# Patient Record
Sex: FEMALE | Race: WHITE | NOT HISPANIC OR LATINO | Employment: FULL TIME | RURAL
[De-identification: names, ages, dates, MRNs, and addresses within clinical notes are randomized per-mention and may not be internally consistent; named-entity substitution may affect disease eponyms.]

---

## 2017-03-16 ENCOUNTER — CONVERSION ENCOUNTER (OUTPATIENT)
Dept: FAMILY MEDICINE CLINIC | Facility: CLINIC | Age: 42
End: 2017-03-16

## 2017-03-16 LAB
25(OH)D3 SERPL-MCNC: 23 NG/ML (ref 30–100)
ALBUMIN SERPL-MCNC: 4 G/DL (ref 3.6–5.1)
ALP SERPL-CCNC: 58 U/L (ref 33–115)
AST SERPL-CCNC: 17 U/L (ref 10–30)
BILIRUB SERPL-MCNC: 1.2 MG/DL (ref 0.2–1.2)
BUN SERPL-MCNC: 11 MG/DL (ref 7–25)
BUN/CREAT SERPL: 12.2 (CALC) (ref 6–22)
CALCIUM SERPL-MCNC: 9.7 MG/DL (ref 8.6–10.2)
CHLORIDE SERPL-SCNC: 100 MMOL/L (ref 98–110)
CONV CO2: 26 MMOL/L (ref 21–33)
CONV TOTAL PROTEIN: 6.8 G/DL (ref 6.2–8.3)
CREAT UR-MCNC: 0.9 MG/DL (ref 0.59–1.07)
EOSINOPHIL # BLD AUTO: 100 CELLS/UL (ref 15–500)
EOSINOPHIL # BLD AUTO: 2 %
ERYTHROCYTE [DISTWIDTH] IN BLOOD BY AUTOMATED COUNT: 13.4 % (ref 11–15)
GLOBULIN UR ELPH-MCNC: 2.8 MG/DL (ref 2.2–3.9)
GLUCOSE UR QL: 98 MG/DL (ref 65–99)
HCT VFR BLD AUTO: 42.4 % (ref 35–45)
HGB BLD-MCNC: 14.6 G/DL (ref 11.7–15.5)
INSULIN SERPL-ACNC: 1.4 (CALC) (ref 1–2.1)
LYMPHOCYTES # BLD AUTO: 1600 CELLS/UL (ref 850–3900)
LYMPHOCYTES NFR BLD AUTO: 20 %
MCH RBC QN AUTO: 28.7 PG (ref 27–33)
MCHC RBC AUTO-ENTMCNC: 34.3 G/DL (ref 32–36)
MCV RBC AUTO: 83.7 FL (ref 80–100)
MONOCYTES # BLD AUTO: 800 CELLS/UL (ref 200–950)
MONOCYTES NFR BLD AUTO: 10 %
NEUTROPHILS # BLD AUTO: 5500 CELLS/UL (ref 1500–7800)
NEUTROPHILS NFR BLD AUTO: 69 %
PLATELET # BLD AUTO: 225 10*3/UL (ref 140–400)
PMV BLD AUTO: 9.6 FL (ref 7.5–11.5)
POTASSIUM SERPL-SCNC: 4.1 MMOL/L (ref 3.5–5.3)
RBC # BLD AUTO: 5.07 MILLION/UL (ref 3.8–5.1)
SODIUM SERPL-SCNC: 136 MMOL/L (ref 135–146)
T4 FREE SERPL-MCNC: 1.2 NG/DL (ref 0.8–1.8)
TSH SERPL-ACNC: 6.67 MIU/L
WBC # BLD AUTO: 8.1 10*3/UL (ref 3.8–10.8)

## 2018-03-27 ENCOUNTER — CONVERSION ENCOUNTER (OUTPATIENT)
Dept: FAMILY MEDICINE CLINIC | Facility: CLINIC | Age: 43
End: 2018-03-27

## 2018-03-27 LAB
ALBUMIN SERPL-MCNC: 4.2 G/DL (ref 3.6–5.1)
ALP SERPL-CCNC: 69 U/L (ref 33–115)
AST SERPL-CCNC: 25 U/L (ref 10–30)
BILIRUB SERPL-MCNC: 0.7 MG/DL (ref 0.2–1.2)
BUN SERPL-MCNC: 14 MG/DL (ref 7–25)
BUN/CREAT SERPL: 12.7 (CALC) (ref 6–22)
CALCIUM SERPL-MCNC: 9.8 MG/DL (ref 8.6–10.2)
CHLORIDE SERPL-SCNC: 102 MMOL/L (ref 98–110)
CHOLEST SERPL-MCNC: 153 MG/DL (ref 125–200)
CONV CO2: 27 MMOL/L (ref 21–33)
CONV TOTAL PROTEIN: 6.4 G/DL (ref 6.2–8.3)
CREAT UR-MCNC: 1.1 MG/DL (ref 0.59–1.07)
EOSINOPHIL # BLD AUTO: 100 CELLS/UL (ref 15–500)
EOSINOPHIL # BLD AUTO: 3 %
ERYTHROCYTE [DISTWIDTH] IN BLOOD BY AUTOMATED COUNT: 13.5 % (ref 11–15)
GLOBULIN UR ELPH-MCNC: 2.2 MG/DL (ref 2.2–3.9)
GLUCOSE UR QL: 92 MG/DL (ref 65–99)
HCT VFR BLD AUTO: 40.4 % (ref 35–45)
HDLC SERPL-MCNC: 74 MG/DL
HGB BLD-MCNC: 13.9 G/DL (ref 11.7–15.5)
INSULIN SERPL-ACNC: 1.9 (CALC) (ref 1–2.1)
LDLC SERPL CALC-MCNC: 71 MG/DL
LYMPHOCYTES # BLD AUTO: 1500 CELLS/UL (ref 850–3900)
LYMPHOCYTES NFR BLD AUTO: 33 %
MCH RBC QN AUTO: 27.8 PG (ref 27–33)
MCHC RBC AUTO-ENTMCNC: 34.5 G/DL (ref 32–36)
MCV RBC AUTO: 80.6 FL (ref 80–100)
MONOCYTES # BLD AUTO: 400 CELLS/UL (ref 200–950)
MONOCYTES NFR BLD AUTO: 9 %
NEUTROPHILS # BLD AUTO: 2400 CELLS/UL (ref 1500–7800)
NEUTROPHILS NFR BLD AUTO: 54 %
PLATELET # BLD AUTO: 281 10*3/UL (ref 140–400)
PMV BLD AUTO: 8.2 FL (ref 7.5–11.5)
POTASSIUM SERPL-SCNC: 4.2 MMOL/L (ref 3.5–5.3)
RBC # BLD AUTO: 5.01 MILLION/UL (ref 3.8–5.1)
SODIUM SERPL-SCNC: 137 MMOL/L (ref 135–146)
T4 FREE SERPL-MCNC: 1.4 NG/DL (ref 0.8–1.8)
TRIGL SERPL-MCNC: 42 MG/DL
TSH SERPL-ACNC: 0.02 MIU/L
WBC # BLD AUTO: 4.5 10*3/UL (ref 3.8–10.8)

## 2019-04-02 ENCOUNTER — CONVERSION ENCOUNTER (OUTPATIENT)
Dept: FAMILY MEDICINE CLINIC | Facility: CLINIC | Age: 44
End: 2019-04-02

## 2019-04-03 LAB
25(OH)D3 SERPL-MCNC: 24 NG/ML (ref 30–100)
ALBUMIN SERPL-MCNC: 4.4 G/DL (ref 3.6–5.1)
ALP SERPL-CCNC: 76 U/L (ref 33–115)
ALT SERPL-CCNC: 8 U/L (ref 6–29)
AST SERPL-CCNC: 11 U/L (ref 10–35)
BASOPHILS # BLD AUTO: 50 CELLS/UL (ref 0–200)
BASOPHILS NFR BLD AUTO: 0.9 %
BILIRUB SERPL-MCNC: 1.6 MG/DL (ref 0.2–1.2)
BUN SERPL-MCNC: 15 MG/DL (ref 7–25)
BUN/CREAT SERPL: ABNORMAL (CALC) (ref 6–22)
CALCIUM SERPL-MCNC: 9.7 MG/DL (ref 8.6–10.2)
CHLORIDE SERPL-SCNC: 103 MMOL/L (ref 98–110)
CHOLEST SERPL-MCNC: 184 MG/DL
CHOLEST/HDLC SERPL: 2.3 (CALC)
CONV CO2: 29 MMOL/L (ref 20–32)
CONV TOTAL PROTEIN: 6.9 G/DL (ref 6.1–8.1)
CREAT UR-MCNC: 0.99 MG/DL (ref 0.5–1.1)
EOSINOPHIL # BLD AUTO: 1.4 %
EOSINOPHIL # BLD AUTO: 78 CELLS/UL (ref 15–500)
ERYTHROCYTE [DISTWIDTH] IN BLOOD BY AUTOMATED COUNT: 12.9 % (ref 11–15)
GLOBULIN UR ELPH-MCNC: 2.5 MG/DL (ref 1.9–3.7)
GLUCOSE UR QL: 97 MG/DL (ref 65–99)
HCT VFR BLD AUTO: 41.7 % (ref 35–45)
HDLC SERPL-MCNC: 79 MG/DL
HGB BLD-MCNC: 14.2 G/DL (ref 11.7–15.5)
INSULIN SERPL-ACNC: 1.8 (CALC) (ref 1–2.5)
LDLC SERPL CALC-MCNC: 91 MG/DL
LYMPHOCYTES # BLD AUTO: 1372 CELLS/UL (ref 850–3900)
LYMPHOCYTES NFR BLD AUTO: 24.5 %
MCH RBC QN AUTO: 28.3 PG (ref 27–33)
MCHC RBC AUTO-ENTMCNC: 34.1 G/DL (ref 32–36)
MCV RBC AUTO: 83.2 FL (ref 80–100)
MONOCYTES # BLD AUTO: 476 CELLS/UL (ref 200–950)
MONOCYTES NFR BLD AUTO: 8.5 %
NEUTROPHILS # BLD AUTO: 3623 CELLS/UL (ref 1500–7800)
NEUTROPHILS NFR BLD AUTO: 64.7 %
NONHDLC SERPL-MCNC: 105 MG/DL
PLATELET # BLD AUTO: 263 10*3/UL (ref 140–400)
PMV BLD AUTO: 10.5 FL (ref 7.5–12.5)
POTASSIUM SERPL-SCNC: 4.2 MMOL/L (ref 3.5–5.3)
RBC # BLD AUTO: 5.01 MILLION/UL (ref 3.8–5.1)
SODIUM SERPL-SCNC: 138 MMOL/L (ref 135–146)
TRIGL SERPL-MCNC: 59 MG/DL
TSH SERPL-ACNC: 0.81 MIU/L
WBC # BLD AUTO: 5.6 10*3/UL (ref 3.8–10.8)

## 2019-07-19 DIAGNOSIS — K29.70 GASTRITIS, PRESENCE OF BLEEDING UNSPECIFIED, UNSPECIFIED CHRONICITY, UNSPECIFIED GASTRITIS TYPE: Primary | ICD-10-CM

## 2019-07-19 RX ORDER — DICYCLOMINE HCL 20 MG
20 TABLET ORAL EVERY 6 HOURS
Qty: 120 TABLET | Refills: 2 | Status: SHIPPED | OUTPATIENT
Start: 2019-07-19 | End: 2020-04-12

## 2019-07-19 NOTE — TELEPHONE ENCOUNTER
PT  STATES SHE IS HAVING FLARE UP OF GASTRITIS -  ABDOMINAL PAIN -    ASKS FOR SOMETHING FOR PAIN -   PLEASE CALL    [facility]   [place]

## 2019-09-21 DIAGNOSIS — E55.9 VITAMIN D DEFICIENCY: Primary | ICD-10-CM

## 2019-09-24 RX ORDER — CHOLECALCIFEROL (VITAMIN D3) 1250 MCG
CAPSULE ORAL
Qty: 4 CAPSULE | Refills: 1 | Status: SHIPPED | OUTPATIENT
Start: 2019-09-24 | End: 2020-08-06

## 2019-10-17 DIAGNOSIS — E03.9 HYPOTHYROIDISM, UNSPECIFIED TYPE: Primary | ICD-10-CM

## 2019-10-18 RX ORDER — LEVOTHYROXINE SODIUM 0.12 MG/1
TABLET ORAL
Qty: 30 TABLET | Refills: 5 | Status: SHIPPED | OUTPATIENT
Start: 2019-10-18 | End: 2020-03-16

## 2019-11-20 DIAGNOSIS — J32.9 SINUSITIS, UNSPECIFIED CHRONICITY, UNSPECIFIED LOCATION: Primary | ICD-10-CM

## 2019-11-20 RX ORDER — AZITHROMYCIN 250 MG/1
TABLET, FILM COATED ORAL
Qty: 6 TABLET | Refills: 0 | Status: SHIPPED | OUTPATIENT
Start: 2019-11-20 | End: 2020-04-12

## 2019-11-20 NOTE — TELEPHONE ENCOUNTER
Pt called and said that she is having sinus pressure and congestion and is wanting to know if you could send in a antiboitc or does she need to be seen.

## 2020-03-15 DIAGNOSIS — E03.9 HYPOTHYROIDISM, UNSPECIFIED TYPE: ICD-10-CM

## 2020-03-16 RX ORDER — LEVOTHYROXINE SODIUM 0.12 MG/1
TABLET ORAL
Qty: 30 TABLET | Refills: 5 | Status: SHIPPED | OUTPATIENT
Start: 2020-03-16 | End: 2020-09-21

## 2020-03-31 DIAGNOSIS — Z13.220 SCREENING FOR HYPERLIPIDEMIA: ICD-10-CM

## 2020-03-31 DIAGNOSIS — E03.9 HYPOTHYROIDISM, UNSPECIFIED TYPE: ICD-10-CM

## 2020-03-31 DIAGNOSIS — R53.83 MALAISE AND FATIGUE: ICD-10-CM

## 2020-03-31 DIAGNOSIS — R53.81 MALAISE AND FATIGUE: ICD-10-CM

## 2020-03-31 DIAGNOSIS — E55.9 VITAMIN D DEFICIENCY: Primary | ICD-10-CM

## 2020-04-01 LAB
25(OH)D3+25(OH)D2 SERPL-MCNC: 30.3 NG/ML (ref 30–100)
ALBUMIN SERPL-MCNC: 4.5 G/DL (ref 3.8–4.8)
ALBUMIN/GLOB SERPL: 2 {RATIO} (ref 1.2–2.2)
ALP SERPL-CCNC: 60 IU/L (ref 39–117)
ALT SERPL-CCNC: 9 IU/L (ref 0–32)
AST SERPL-CCNC: 13 IU/L (ref 0–40)
BASOPHILS # BLD AUTO: 0.1 X10E3/UL (ref 0–0.2)
BASOPHILS NFR BLD AUTO: 1 %
BILIRUB SERPL-MCNC: 1.7 MG/DL (ref 0–1.2)
BUN SERPL-MCNC: 10 MG/DL (ref 6–24)
BUN/CREAT SERPL: 9 (ref 9–23)
CALCIUM SERPL-MCNC: 9.5 MG/DL (ref 8.7–10.2)
CHLORIDE SERPL-SCNC: 102 MMOL/L (ref 96–106)
CHOLEST SERPL-MCNC: 157 MG/DL (ref 100–199)
CHOLEST/HDLC SERPL: 2.5 RATIO (ref 0–4.4)
CO2 SERPL-SCNC: 22 MMOL/L (ref 20–29)
CREAT SERPL-MCNC: 1.11 MG/DL (ref 0.57–1)
EOSINOPHIL # BLD AUTO: 0.1 X10E3/UL (ref 0–0.4)
EOSINOPHIL NFR BLD AUTO: 1 %
ERYTHROCYTE [DISTWIDTH] IN BLOOD BY AUTOMATED COUNT: 12.7 % (ref 11.7–15.4)
GLOBULIN SER CALC-MCNC: 2.3 G/DL (ref 1.5–4.5)
GLUCOSE SERPL-MCNC: 90 MG/DL (ref 65–99)
HCT VFR BLD AUTO: 40.8 % (ref 34–46.6)
HDLC SERPL-MCNC: 64 MG/DL
HGB BLD-MCNC: 13.6 G/DL (ref 11.1–15.9)
IMM GRANULOCYTES # BLD AUTO: 0 X10E3/UL (ref 0–0.1)
IMM GRANULOCYTES NFR BLD AUTO: 0 %
LDLC SERPL CALC-MCNC: 85 MG/DL (ref 0–99)
LYMPHOCYTES # BLD AUTO: 1.3 X10E3/UL (ref 0.7–3.1)
LYMPHOCYTES NFR BLD AUTO: 25 %
MCH RBC QN AUTO: 28 PG (ref 26.6–33)
MCHC RBC AUTO-ENTMCNC: 33.3 G/DL (ref 31.5–35.7)
MCV RBC AUTO: 84 FL (ref 79–97)
MONOCYTES # BLD AUTO: 0.5 X10E3/UL (ref 0.1–0.9)
MONOCYTES NFR BLD AUTO: 9 %
NEUTROPHILS # BLD AUTO: 3.5 X10E3/UL (ref 1.4–7)
NEUTROPHILS NFR BLD AUTO: 64 %
PLATELET # BLD AUTO: 233 X10E3/UL (ref 150–450)
POTASSIUM SERPL-SCNC: 4.1 MMOL/L (ref 3.5–5.2)
PROT SERPL-MCNC: 6.8 G/DL (ref 6–8.5)
RBC # BLD AUTO: 4.85 X10E6/UL (ref 3.77–5.28)
SODIUM SERPL-SCNC: 139 MMOL/L (ref 134–144)
T4 FREE SERPL-MCNC: 1.59 NG/DL (ref 0.82–1.77)
TRIGL SERPL-MCNC: 41 MG/DL (ref 0–149)
TSH SERPL DL<=0.005 MIU/L-ACNC: 0.07 UIU/ML (ref 0.45–4.5)
VLDLC SERPL CALC-MCNC: 8 MG/DL (ref 5–40)
WBC # BLD AUTO: 5.4 X10E3/UL (ref 3.4–10.8)

## 2020-04-03 ENCOUNTER — TELEPHONE (OUTPATIENT)
Dept: FAMILY MEDICINE CLINIC | Facility: CLINIC | Age: 45
End: 2020-04-03

## 2020-04-03 NOTE — TELEPHONE ENCOUNTER
----- Message from Catalino Valentin MD sent at 4/1/2020  9:25 AM EDT -----  Add a free T4 onto her labs drawn yesterday, then

## 2020-04-07 ENCOUNTER — OFFICE VISIT (OUTPATIENT)
Dept: FAMILY MEDICINE CLINIC | Facility: CLINIC | Age: 45
End: 2020-04-07

## 2020-04-07 VITALS
OXYGEN SATURATION: 100 % | HEIGHT: 62 IN | BODY MASS INDEX: 26.57 KG/M2 | WEIGHT: 144.4 LBS | HEART RATE: 78 BPM | SYSTOLIC BLOOD PRESSURE: 121 MMHG | DIASTOLIC BLOOD PRESSURE: 75 MMHG | RESPIRATION RATE: 18 BRPM | TEMPERATURE: 98.2 F

## 2020-04-07 DIAGNOSIS — N95.1 MENOPAUSAL SYNDROME: ICD-10-CM

## 2020-04-07 DIAGNOSIS — Z00.01 ENCOUNTER FOR ANNUAL GENERAL MEDICAL EXAMINATION WITH ABNORMAL FINDINGS IN ADULT: Primary | ICD-10-CM

## 2020-04-07 DIAGNOSIS — Z12.31 ENCOUNTER FOR SCREENING MAMMOGRAM FOR MALIGNANT NEOPLASM OF BREAST: ICD-10-CM

## 2020-04-07 DIAGNOSIS — E66.3 OVERWEIGHT: ICD-10-CM

## 2020-04-07 DIAGNOSIS — E03.9 ACQUIRED HYPOTHYROIDISM: ICD-10-CM

## 2020-04-07 PROBLEM — R73.9 ELEVATED BLOOD SUGAR: Status: ACTIVE | Noted: 2020-04-07

## 2020-04-07 PROBLEM — Z30.2 REQUEST FOR STERILIZATION: Status: ACTIVE | Noted: 2020-04-07

## 2020-04-07 PROBLEM — E55.9 VITAMIN D DEFICIENCY: Status: ACTIVE | Noted: 2020-04-07

## 2020-04-07 PROBLEM — I10 HYPERTENSION, BENIGN: Status: ACTIVE | Noted: 2020-04-07

## 2020-04-07 PROBLEM — J30.9 ALLERGIC RHINITIS: Status: ACTIVE | Noted: 2020-04-07

## 2020-04-07 PROBLEM — J45.909 ASTHMA: Status: ACTIVE | Noted: 2020-04-07

## 2020-04-07 PROBLEM — Z22.330 CARRIER OF GROUP B STREPTOCOCCUS: Status: ACTIVE | Noted: 2020-04-07

## 2020-04-07 LAB
BILIRUB BLD-MCNC: NEGATIVE MG/DL
CLARITY, POC: CLEAR
COLOR UR: YELLOW
GLUCOSE UR STRIP-MCNC: NEGATIVE MG/DL
KETONES UR QL: NEGATIVE
LEUKOCYTE EST, POC: NEGATIVE
NITRITE UR-MCNC: NEGATIVE MG/ML
PH UR: 5 [PH] (ref 5–8)
PROT UR STRIP-MCNC: NEGATIVE MG/DL
RBC # UR STRIP: ABNORMAL /UL
SP GR UR: 1 (ref 1–1.03)
UROBILINOGEN UR QL: NORMAL

## 2020-04-07 PROCEDURE — 81003 URINALYSIS AUTO W/O SCOPE: CPT | Performed by: FAMILY MEDICINE

## 2020-04-07 PROCEDURE — 99396 PREV VISIT EST AGE 40-64: CPT | Performed by: FAMILY MEDICINE

## 2020-04-07 RX ORDER — FLUTICASONE PROPIONATE 50 MCG
SPRAY, SUSPENSION (ML) NASAL
COMMUNITY
Start: 2018-06-09

## 2020-04-07 NOTE — PROGRESS NOTES
Subjective   Carin Duque is a 44 y.o. female.     Chief Complaint   Patient presents with   • Annual Exam   • Hypothyroidism   • Hypertension       The patient is here: to discuss health maintenance and disease prevention to follow up on multiple medical problems.  Last comprehensive physical was on 2019.  Previous physical was performed by  Catalino Valentin MD  Overall has: exercises 2 - 3 times per week, good appetite, feels well with no complaints, good energy level and is sleeping poorly. Nutrition: balanced diet. Last tetanus shot was 2017. Patient is doing routine self breast exams: occasionally Patient's last PAP Smear was: 2018 at her ob when she was pregnant. The Patient's last Mammogram was: 2019.    Hypothyroidism   This is a chronic problem. The current episode started more than 1 year ago. The problem occurs constantly. Pertinent negatives include no abdominal pain, chest pain, chills, diaphoresis or nausea.        Recent Hospitalizations:  No hospitalization(s) within the last year..  ccc      I personally reviewed and updated the patient's allergies, medications, problem list, and past medical, surgical, social, and family history.     Family History   Problem Relation Age of Onset   • Cancer Mother    • Hyperlipidemia Mother    • Coronary artery disease Mother        Social History     Tobacco Use   • Smoking status: Never Smoker   • Smokeless tobacco: Never Used   Substance Use Topics   • Alcohol use: Yes     Comment: Moderate.  Drinks wine   • Drug use: No       Past Surgical History:   Procedure Laterality Date   • BREAST AUGMENTATION      [facility]   •  SECTION      [facility]       Patient Active Problem List   Diagnosis   • Asthma   • Allergic rhinitis   • Carrier of group B Streptococcus   • Elevated blood sugar   • Hypertension, benign   • Hypothyroidism   • Menopausal syndrome   • Overweight   • Request for sterilization   • Encounter for annual general  "medical examination with abnormal findings in adult   • Encounter for screening mammogram for malignant neoplasm of breast   • Vitamin D deficiency         Current Outpatient Medications:   •  Cholecalciferol (VITAMIN D3) 54856 units capsule, TAKE ONE CAPSULE BY MOUTH EVERY WEEK, Disp: 4 capsule, Rfl: 1  •  fluticasone (FLONASE) 50 MCG/ACT nasal spray, into the nostril(s) as directed by provider., Disp: , Rfl:   •  levothyroxine (SYNTHROID, LEVOTHROID) 125 MCG tablet, TAKE 1 TABLET BY MOUTH EVERY DAY, Disp: 30 tablet, Rfl: 5  •  azithromycin (ZITHROMAX) 250 MG tablet, Take 2 tablets the first day, then 1 tablet daily for 4 days., Disp: 6 tablet, Rfl: 0  •  dicyclomine (BENTYL) 20 MG tablet, Take 1 tablet by mouth Every 6 (Six) Hours., Disp: 120 tablet, Rfl: 2         Review of Systems   Constitutional: Negative for chills and diaphoresis.   HENT: Negative for trouble swallowing and voice change.    Eyes: Negative for visual disturbance.   Respiratory: Negative for shortness of breath.    Cardiovascular: Negative for chest pain and palpitations.   Gastrointestinal: Negative for abdominal pain and nausea.   Endocrine: Negative for polydipsia and polyphagia.   Genitourinary: Negative for hematuria.   Musculoskeletal: Negative for neck stiffness.   Skin: Negative for color change and pallor.   Allergic/Immunologic: Negative for immunocompromised state.   Neurological: Negative for seizures and syncope.   Hematological: Negative for adenopathy.   Psychiatric/Behavioral: Negative for sleep disturbance and suicidal ideas.       I have reviewed and confirmed the accuracy of the ROS as documented by the MA/LPN/RN Catalino Valentin MD      Objective   /75 (BP Location: Right arm, Patient Position: Sitting, Cuff Size: Adult)   Pulse 78   Temp 98.2 °F (36.8 °C)   Resp 18   Ht 157.5 cm (62\")   Wt 65.5 kg (144 lb 6.4 oz)   LMP 04/02/2020   SpO2 100%   BMI 26.41 kg/m²   Wt Readings from Last 3 Encounters:   04/07/20 " 65.5 kg (144 lb 6.4 oz)   05/23/19 69.6 kg (153 lb 6 oz)   04/09/19 68.9 kg (152 lb)     Physical Exam   Constitutional: She is oriented to person, place, and time. She appears well-developed and well-nourished.   HENT:   Head: Normocephalic.   Right Ear: Tympanic membrane, external ear and ear canal normal.   Left Ear: Tympanic membrane, external ear and ear canal normal.   Nose: Nose normal.   Eyes: Pupils are equal, round, and reactive to light. Conjunctivae, EOM and lids are normal.   Neck: No JVD present. Carotid bruit is not present. No tracheal deviation present. No thyromegaly present.   Cardiovascular: Normal rate, regular rhythm, normal heart sounds and intact distal pulses. Exam reveals no gallop and no friction rub.   No murmur heard.  Pulmonary/Chest: Effort normal and breath sounds normal. No stridor. She has no decreased breath sounds. She has no wheezes. She has no rales.   Abdominal: Soft. Bowel sounds are normal. She exhibits no distension and no mass. There is no tenderness. There is no rebound and no guarding. No hernia.   Lymphadenopathy:        Head (right side): No submental, no submandibular, no tonsillar, no preauricular, no posterior auricular and no occipital adenopathy present.        Head (left side): No submental, no submandibular, no tonsillar, no preauricular, no posterior auricular and no occipital adenopathy present.     She has no cervical adenopathy.   Neurological: She is alert and oriented to person, place, and time. She has normal strength and normal reflexes. No cranial nerve deficit or sensory deficit. Coordination and gait normal.   Skin: Skin is warm and dry. Turgor is normal. She is not diaphoretic. No pallor.       Recent Lab Results:    Lab Results   Component Value Date    GLU 90 03/31/2020     Lab Results   Component Value Date    HGBA1C 5.3 04/03/2018     Lab Results   Component Value Date    CHOL 184 04/02/2019    TRIG 41 03/31/2020    HDL 64 03/31/2020    VLDL 8  03/31/2020     LDL Cholesterol    Date Value Ref Range Status   03/31/2020 85 0 - 99 mg/dL Final   04/02/2019 91 NR Final   03/27/2018 71 <130 mg/dL Final     No results found for: PSA  Lab Results   Component Value Date    WBC 5.4 03/31/2020    HGB 13.6 03/31/2020    HCT 40.8 03/31/2020    MCV 84 03/31/2020     03/31/2020     Lab Results   Component Value Date    TSH 0.067 (L) 03/31/2020     Lab Results   Component Value Date    BUN 10 03/31/2020    CREATININE 1.11 (H) 03/31/2020    EGFRIFNONA 60 03/31/2020    EGFRIFAFRI 69 03/31/2020    BCR 9 03/31/2020    K 4.1 03/31/2020    CO2 22 03/31/2020    CALCIUM 9.5 03/31/2020    PROTENTOTREF 6.8 03/31/2020    ALBUMIN 4.5 03/31/2020    LABIL2 2.0 03/31/2020    AST 13 03/31/2020    ALT 9 03/31/2020         Age-appropriate Screening Schedule:  Refer to the list below for future screening recommendations based on patient's age, sex and/or medical conditions. Orders for these recommended tests are listed in the plan section. The patient has been provided with a written plan.    Health Maintenance   Topic Date Due   • PAP SMEAR  07/19/2019   • INFLUENZA VACCINE  08/01/2020   • TDAP/TD VACCINES (2 - Td) 11/07/2027           Assessment/Plan       Physical.  Doing well, vaccines current.  Discussed calcium and vitamin D.  Coated baby aspirin daily.  Followed by OB/GYN Dr. Duarte Pap/mammo-current.  Discussed health maintenance, screening test lifestyle modification.  Hypothyroidism.  Good control.  Has had thyroid ultrasound, will get records.  Follow-up recheck.  Vitamin D deficiency.  Improved/normalized with replacement.  Varicose veins.  Mild symptoms currently.  Consider vascular surgery referral if worsening symptoms.    Problem List Items Addressed This Visit        Unprioritized    Hypothyroidism    Menopausal syndrome    Overweight    Encounter for annual general medical examination with abnormal findings in adult - Primary    Relevant Orders    POC Urinalysis  Dipstick, Automated (Completed)    Encounter for screening mammogram for malignant neoplasm of breast              Expected course, medications, and adverse effects discussed.  Call or return if worsening or persistent symptoms.
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
## 2020-05-19 RX ORDER — DOXYCYCLINE 100 MG/1
100 CAPSULE ORAL 2 TIMES DAILY
Qty: 20 CAPSULE | Refills: 0 | Status: SHIPPED | OUTPATIENT
Start: 2020-05-19 | End: 2020-08-06

## 2020-05-19 NOTE — TELEPHONE ENCOUNTER
Mary called  Requested medication , has 3 tic bite 2 have healed well, one ion back red'    Per Dr Valentin, jean marie was sent , and Mary is to call if any changes in symptoms.

## 2020-08-06 ENCOUNTER — OFFICE VISIT (OUTPATIENT)
Dept: FAMILY MEDICINE CLINIC | Facility: CLINIC | Age: 45
End: 2020-08-06

## 2020-08-06 VITALS
OXYGEN SATURATION: 99 % | BODY MASS INDEX: 27.08 KG/M2 | RESPIRATION RATE: 18 BRPM | TEMPERATURE: 98.1 F | WEIGHT: 143.4 LBS | SYSTOLIC BLOOD PRESSURE: 122 MMHG | HEIGHT: 61 IN | DIASTOLIC BLOOD PRESSURE: 76 MMHG | HEART RATE: 92 BPM

## 2020-08-06 DIAGNOSIS — L23.7 POISON IVY: Primary | ICD-10-CM

## 2020-08-06 PROCEDURE — 99213 OFFICE O/P EST LOW 20 MIN: CPT | Performed by: FAMILY MEDICINE

## 2020-08-06 PROCEDURE — 96372 THER/PROPH/DIAG INJ SC/IM: CPT | Performed by: FAMILY MEDICINE

## 2020-08-06 RX ORDER — PREDNISONE 20 MG/1
20 TABLET ORAL DAILY
Qty: 20 TABLET | Refills: 0 | Status: SHIPPED | OUTPATIENT
Start: 2020-08-06 | End: 2021-04-12

## 2020-08-06 RX ORDER — TRIAMCINOLONE ACETONIDE 1 MG/G
CREAM TOPICAL 2 TIMES DAILY
Qty: 30 G | Refills: 0 | Status: SHIPPED | OUTPATIENT
Start: 2020-08-06 | End: 2022-04-13

## 2020-08-06 RX ORDER — METHYLPREDNISOLONE ACETATE 80 MG/ML
80 INJECTION, SUSPENSION INTRA-ARTICULAR; INTRALESIONAL; INTRAMUSCULAR; SOFT TISSUE ONCE
Status: COMPLETED | OUTPATIENT
Start: 2020-08-06 | End: 2020-08-06

## 2020-08-06 RX ADMIN — METHYLPREDNISOLONE ACETATE 80 MG: 80 INJECTION, SUSPENSION INTRA-ARTICULAR; INTRALESIONAL; INTRAMUSCULAR; SOFT TISSUE at 11:22

## 2020-08-06 NOTE — PROGRESS NOTES
Subjective   Carin Duque is a 44 y.o. female.     Chief Complaint   Patient presents with   • Poison Ivy       Poison Ivy   This is a new problem. The current episode started yesterday (Tuesday). The affected locations include the left wrist, right hip, right elbow, genitalia and right hand. The rash is characterized by blistering, itchiness, pain, burning and swelling. She was exposed to plant contact (trimming tree limbs). Pertinent negatives include no cough, diarrhea, fatigue, fever, joint pain, rhinorrhea, shortness of breath or vomiting. Treatments tried: calmine lotion, rubbing alcohol. The treatment provided mild relief.        The following portions of the patient's history were reviewed and updated as appropriate: allergies, current medications, past family history, past medical history, past social history, past surgical history and problem list.    Allergies:  No Known Allergies    Social History:  Social History     Socioeconomic History   • Marital status:      Spouse name: Not on file   • Number of children: Not on file   • Years of education: Not on file   • Highest education level: Not on file   Tobacco Use   • Smoking status: Never Smoker   • Smokeless tobacco: Never Used   Substance and Sexual Activity   • Alcohol use: Yes     Comment: Moderate.  Drinks wine   • Drug use: No   • Sexual activity: Yes     Partners: Male       Family History:  Family History   Problem Relation Age of Onset   • Cancer Mother    • Hyperlipidemia Mother    • Coronary artery disease Mother        Past Medical History :  Patient Active Problem List   Diagnosis   • Asthma   • Allergic rhinitis   • Carrier of group B Streptococcus   • Elevated blood sugar   • Hypertension, benign   • Hypothyroidism   • Menopausal syndrome   • Overweight   • Request for sterilization   • Encounter for annual general medical examination with abnormal findings in adult   • Encounter for screening mammogram for malignant  "neoplasm of breast   • Vitamin D deficiency   • Poison ivy dermatitis       Medication List:    Current Outpatient Medications:   •  fluticasone (FLONASE) 50 MCG/ACT nasal spray, into the nostril(s) as directed by provider., Disp: , Rfl:   •  levothyroxine (SYNTHROID, LEVOTHROID) 125 MCG tablet, TAKE 1 TABLET BY MOUTH EVERY DAY, Disp: 30 tablet, Rfl: 5  •  predniSONE (DELTASONE) 20 MG tablet, Take 1 tablet by mouth Daily. TID x 3 days, BID x 3 days, QD x 3 days, 1/2 tab daily x 4 days, Disp: 20 tablet, Rfl: 0  •  triamcinolone (KENALOG) 0.1 % cream, Apply  topically to the appropriate area as directed 2 (Two) Times a Day., Disp: 30 g, Rfl: 0    Past Surgical History:  Past Surgical History:   Procedure Laterality Date   • BREAST AUGMENTATION      [facility]   •  SECTION      [facility]       Review of Systems:  Review of Systems   Constitutional: Negative for activity change, fatigue and fever.   HENT: Negative for ear pain, rhinorrhea, sinus pressure and voice change.    Eyes: Negative for visual disturbance.   Respiratory: Negative for cough and shortness of breath.    Cardiovascular: Negative for chest pain.   Gastrointestinal: Negative for abdominal pain, diarrhea, nausea and vomiting.   Endocrine: Negative for cold intolerance and heat intolerance.   Genitourinary: Negative for frequency and urgency.   Musculoskeletal: Negative for arthralgias and joint pain.   Skin: Negative for rash.   Neurological: Negative for syncope.   Hematological: Does not bruise/bleed easily.   Psychiatric/Behavioral: Negative for depressed mood. The patient is not nervous/anxious.        Physical Exam:  Vital Signs:  Visit Vitals  /76   Pulse 92   Temp 98.1 °F (36.7 °C)   Resp 18   Ht 154.9 cm (61\")   Wt 65 kg (143 lb 6.4 oz)   LMP 2020 (Approximate)   SpO2 99%   BMI 27.10 kg/m²       Physical Exam   Constitutional: She is oriented to person, place, and time. She appears well-developed and well-nourished. " She is cooperative.   Cardiovascular: Normal rate, regular rhythm and normal heart sounds. Exam reveals no gallop and no friction rub.   No murmur heard.  Pulmonary/Chest: Effort normal and breath sounds normal. She has no wheezes. She has no rales.   Neurological: She is alert and oriented to person, place, and time. Coordination normal.   Skin: Skin is warm and dry. Rash noted.   erythmatous papules on arms and legs.    Psychiatric: She has a normal mood and affect.   Vitals reviewed.      Assessment and Plan:  Problem List Items Addressed This Visit        Musculoskeletal and Integument    Poison ivy dermatitis - Primary    Relevant Medications    methylPREDNISolone acetate (DEPO-medrol) injection 80 mg (Completed)    predniSONE (DELTASONE) 20 MG tablet    triamcinolone (KENALOG) 0.1 % cream        Discussed risks of steroids: hyperglycemia, osteoporosis, avascular necrosis, anxiety, insomnia and cataracts. Patient states understanding  Diagnosis, treatment and and course discussed. Potential side effects discussed. Return if there is worsening or persistence of symptoms.       An After Visit Summary and PPPS were given to the patient.             I wore protective equipment throughout this patient encounter to include mask and gloves. Hand hygiene was performed before donning protective equipment and after removal when leaving the room.

## 2020-09-21 DIAGNOSIS — E03.9 HYPOTHYROIDISM, UNSPECIFIED TYPE: ICD-10-CM

## 2020-09-21 RX ORDER — LEVOTHYROXINE SODIUM 0.12 MG/1
TABLET ORAL
Qty: 90 TABLET | Refills: 1 | Status: SHIPPED | OUTPATIENT
Start: 2020-09-21 | End: 2021-02-22

## 2020-10-05 ENCOUNTER — FLU SHOT (OUTPATIENT)
Dept: FAMILY MEDICINE CLINIC | Facility: CLINIC | Age: 45
End: 2020-10-05

## 2020-10-05 DIAGNOSIS — Z23 NEED FOR INFLUENZA VACCINATION: ICD-10-CM

## 2020-10-05 PROCEDURE — 90686 IIV4 VACC NO PRSV 0.5 ML IM: CPT | Performed by: FAMILY MEDICINE

## 2020-10-05 PROCEDURE — 90471 IMMUNIZATION ADMIN: CPT | Performed by: FAMILY MEDICINE

## 2021-02-20 DIAGNOSIS — E03.9 HYPOTHYROIDISM, UNSPECIFIED TYPE: ICD-10-CM

## 2021-02-22 RX ORDER — LEVOTHYROXINE SODIUM 0.12 MG/1
TABLET ORAL
Qty: 90 TABLET | Refills: 1 | Status: SHIPPED | OUTPATIENT
Start: 2021-02-22 | End: 2021-08-23

## 2021-03-31 DIAGNOSIS — R53.83 MALAISE AND FATIGUE: ICD-10-CM

## 2021-03-31 DIAGNOSIS — E55.9 VITAMIN D DEFICIENCY: ICD-10-CM

## 2021-03-31 DIAGNOSIS — R53.81 MALAISE AND FATIGUE: ICD-10-CM

## 2021-03-31 DIAGNOSIS — Z13.220 SCREENING FOR HYPERLIPIDEMIA: ICD-10-CM

## 2021-03-31 DIAGNOSIS — E03.9 HYPOTHYROIDISM, UNSPECIFIED TYPE: Primary | ICD-10-CM

## 2021-04-01 LAB
25(OH)D3+25(OH)D2 SERPL-MCNC: 41 NG/ML (ref 30–100)
ALBUMIN SERPL-MCNC: 4.8 G/DL (ref 3.8–4.8)
ALBUMIN/GLOB SERPL: 1.9 {RATIO} (ref 1.2–2.2)
ALP SERPL-CCNC: 57 IU/L (ref 39–117)
ALT SERPL-CCNC: 10 IU/L (ref 0–32)
AST SERPL-CCNC: 15 IU/L (ref 0–40)
BASOPHILS # BLD AUTO: 0.1 X10E3/UL (ref 0–0.2)
BASOPHILS NFR BLD AUTO: 1 %
BILIRUB SERPL-MCNC: 0.9 MG/DL (ref 0–1.2)
BUN SERPL-MCNC: 13 MG/DL (ref 6–24)
BUN/CREAT SERPL: 11 (ref 9–23)
CALCIUM SERPL-MCNC: 9.8 MG/DL (ref 8.7–10.2)
CHLORIDE SERPL-SCNC: 103 MMOL/L (ref 96–106)
CHOLEST SERPL-MCNC: 187 MG/DL (ref 100–199)
CHOLEST/HDLC SERPL: 2.5 RATIO (ref 0–4.4)
CO2 SERPL-SCNC: 22 MMOL/L (ref 20–29)
CREAT SERPL-MCNC: 1.21 MG/DL (ref 0.57–1)
EOSINOPHIL # BLD AUTO: 0.2 X10E3/UL (ref 0–0.4)
EOSINOPHIL NFR BLD AUTO: 3 %
ERYTHROCYTE [DISTWIDTH] IN BLOOD BY AUTOMATED COUNT: 13.1 % (ref 11.7–15.4)
GLOBULIN SER CALC-MCNC: 2.5 G/DL (ref 1.5–4.5)
GLUCOSE SERPL-MCNC: 102 MG/DL (ref 65–99)
HCT VFR BLD AUTO: 43.5 % (ref 34–46.6)
HDLC SERPL-MCNC: 75 MG/DL
HGB BLD-MCNC: 14.7 G/DL (ref 11.1–15.9)
IMM GRANULOCYTES # BLD AUTO: 0 X10E3/UL (ref 0–0.1)
IMM GRANULOCYTES NFR BLD AUTO: 0 %
LDLC SERPL CALC-MCNC: 103 MG/DL (ref 0–99)
LYMPHOCYTES # BLD AUTO: 2.1 X10E3/UL (ref 0.7–3.1)
LYMPHOCYTES NFR BLD AUTO: 43 %
MCH RBC QN AUTO: 28.8 PG (ref 26.6–33)
MCHC RBC AUTO-ENTMCNC: 33.8 G/DL (ref 31.5–35.7)
MCV RBC AUTO: 85 FL (ref 79–97)
MONOCYTES # BLD AUTO: 0.6 X10E3/UL (ref 0.1–0.9)
MONOCYTES NFR BLD AUTO: 12 %
NEUTROPHILS # BLD AUTO: 2.1 X10E3/UL (ref 1.4–7)
NEUTROPHILS NFR BLD AUTO: 41 %
PLATELET # BLD AUTO: 179 X10E3/UL (ref 150–450)
POTASSIUM SERPL-SCNC: 4.3 MMOL/L (ref 3.5–5.2)
PROT SERPL-MCNC: 7.3 G/DL (ref 6–8.5)
RBC # BLD AUTO: 5.1 X10E6/UL (ref 3.77–5.28)
SODIUM SERPL-SCNC: 138 MMOL/L (ref 134–144)
T3FREE SERPL-MCNC: 2.3 PG/ML (ref 2–4.4)
T4 FREE SERPL-MCNC: 1.31 NG/DL (ref 0.82–1.77)
TRIGL SERPL-MCNC: 43 MG/DL (ref 0–149)
TSH SERPL DL<=0.005 MIU/L-ACNC: 1.85 UIU/ML (ref 0.45–4.5)
VLDLC SERPL CALC-MCNC: 9 MG/DL (ref 5–40)
WBC # BLD AUTO: 5 X10E3/UL (ref 3.4–10.8)

## 2021-04-09 NOTE — PROGRESS NOTES
Subjective   Carin Duque is a 45 y.o. female.     Chief Complaint   Patient presents with   • Annual Exam   • Hypothyroidism   • Blood Sugar Problem       The patient is here: for coordination of medical care to discuss health maintenance and disease prevention to follow up on multiple medical problems.  Last comprehensive physical was on 4/7/2020.  Previous physical was performed by  Catalino Valentin MD  Overall has: vigorous activity with work/home activities, exercises 2 - 3 times per week, good appetite, feels well with minor complaints, good energy level and is sleeping poorly. Nutrition: appropriate balanced diet. Last tetanus shot was 11/7/2017.   Patient is doing routine self breast exams: monthly Last Mammogram was 10/3/19.  Last Completed Pap Smear       Status Date      PAP SMEAR Done 4/5/2016 SCANNED - PAP SMEAR     Patient has more history with this topic...          Hypothyroidism  This is a chronic problem. The current episode started more than 1 year ago. The problem occurs constantly. Associated symptoms include congestion. Pertinent negatives include no abdominal pain, chest pain, chills, coughing, diaphoresis, fatigue, headaches, nausea, rash, sore throat or swollen glands.   Blood Sugar Problem  This is a recurrent problem. The current episode started more than 1 year ago. The problem occurs intermittently. The problem has been unchanged. Associated symptoms include congestion. Pertinent negatives include no abdominal pain, chest pain, chills, coughing, diaphoresis, fatigue, headaches, nausea, rash, sore throat or swollen glands.        Recent Hospitalizations:  No hospitalization(s) within the last year..  ccc      I personally reviewed and updated the patient's allergies, medications, problem list, and past medical, surgical, social, and family history. I have reviewed and confirmed the accuracy of the HPI and ROS as documented by the MA/LPN/RN Catalino Valentin MD    Family History    Problem Relation Age of Onset   • Cancer Mother    • Hyperlipidemia Mother    • Coronary artery disease Mother        Social History     Tobacco Use   • Smoking status: Never Smoker   • Smokeless tobacco: Never Used   Vaping Use   • Vaping Use: Never used   Substance Use Topics   • Alcohol use: Yes     Comment: Moderate.  Drinks wine   • Drug use: No       Past Surgical History:   Procedure Laterality Date   • BREAST AUGMENTATION      [facility]   •  SECTION      [facility]       Patient Active Problem List   Diagnosis   • Asthma   • Allergic rhinitis   • Carrier of group B Streptococcus   • Elevated blood sugar   • Hypertension, benign   • Hypothyroidism   • Menopausal syndrome   • Overweight   • Request for sterilization   • Encounter for annual general medical examination with abnormal findings in adult   • Encounter for screening mammogram for malignant neoplasm of breast   • Vitamin D deficiency         Current Outpatient Medications:   •  fluticasone (FLONASE) 50 MCG/ACT nasal spray, into the nostril(s) as directed by provider., Disp: , Rfl:   •  levothyroxine (SYNTHROID, LEVOTHROID) 125 MCG tablet, TAKE 1 TABLET BY MOUTH EVERY DAY, Disp: 90 tablet, Rfl: 1  •  triamcinolone (KENALOG) 0.1 % cream, Apply  topically to the appropriate area as directed 2 (Two) Times a Day., Disp: 30 g, Rfl: 0    Review of Systems   Constitutional: Negative for chills, diaphoresis and fatigue.   HENT: Positive for congestion. Negative for sore throat, swollen glands, trouble swallowing and voice change.    Eyes: Negative for visual disturbance.   Respiratory: Negative for cough and shortness of breath.    Cardiovascular: Negative for chest pain and palpitations.   Gastrointestinal: Negative for abdominal pain and nausea.   Endocrine: Negative for polydipsia and polyphagia.   Genitourinary: Negative for hematuria.   Musculoskeletal: Negative for neck stiffness.   Skin: Negative for color change, pallor and rash.  "  Allergic/Immunologic: Negative for immunocompromised state.   Neurological: Negative for seizures and syncope.   Hematological: Negative for adenopathy.   Psychiatric/Behavioral: Negative for hallucinations, sleep disturbance and suicidal ideas.       I have reviewed and confirmed the accuracy of the ROS as documented by the MA/LPN/RN Catalino Valentin MD      Objective   /84   Pulse 74   Temp 98 °F (36.7 °C)   Resp 18   Ht 154.9 cm (61\")   Wt 65.6 kg (144 lb 9.6 oz)   LMP 04/07/2021   SpO2 99%   Breastfeeding No   BMI 27.32 kg/m²   BP Readings from Last 3 Encounters:   04/12/21 110/84   08/06/20 122/76   04/07/20 121/75     Wt Readings from Last 3 Encounters:   04/12/21 65.6 kg (144 lb 9.6 oz)   08/06/20 65 kg (143 lb 6.4 oz)   04/07/20 65.5 kg (144 lb 6.4 oz)     Physical Exam  Constitutional:       Appearance: She is well-developed. She is not diaphoretic.   HENT:      Head: Normocephalic.      Right Ear: Tympanic membrane, ear canal and external ear normal.      Left Ear: Tympanic membrane, ear canal and external ear normal.      Nose: Nose normal.   Eyes:      General: Lids are normal.      Conjunctiva/sclera: Conjunctivae normal.      Pupils: Pupils are equal, round, and reactive to light.   Neck:      Thyroid: No thyromegaly.      Vascular: No carotid bruit or JVD.      Trachea: No tracheal deviation.   Cardiovascular:      Rate and Rhythm: Normal rate and regular rhythm.      Heart sounds: Normal heart sounds. No murmur heard.   No friction rub. No gallop.    Pulmonary:      Effort: Pulmonary effort is normal.      Breath sounds: Normal breath sounds. No stridor. No decreased breath sounds, wheezing or rales.   Abdominal:      General: Bowel sounds are normal. There is no distension.      Palpations: Abdomen is soft. There is no mass.      Tenderness: There is no abdominal tenderness. There is no guarding or rebound.      Hernia: No hernia is present.   Lymphadenopathy:      Head:      Right " side of head: No submental, submandibular, tonsillar, preauricular, posterior auricular or occipital adenopathy.      Left side of head: No submental, submandibular, tonsillar, preauricular, posterior auricular or occipital adenopathy.      Cervical: No cervical adenopathy.   Skin:     General: Skin is warm and dry.      Coloration: Skin is not pale.   Neurological:      Mental Status: She is alert and oriented to person, place, and time.      Cranial Nerves: No cranial nerve deficit.      Sensory: No sensory deficit.      Coordination: Coordination normal.      Gait: Gait normal.      Deep Tendon Reflexes: Reflexes are normal and symmetric.         Data / Lab Results:    Hemoglobin A1C   Date Value Ref Range Status   04/12/2021 5.4 % Final   04/03/2018 5.3 4.6 - 7.1 % Final     Lab Results   Component Value Date     (H) 03/31/2021     Lab Results   Component Value Date     (H) 03/31/2021    LDL 85 03/31/2020    LDL 91 04/02/2019     Lab Results   Component Value Date    CHOL 184 04/02/2019    CHOL 153 03/27/2018     Lab Results   Component Value Date    TRIG 43 03/31/2021    TRIG 41 03/31/2020    TRIG 59 04/02/2019     Lab Results   Component Value Date    HDL 75 03/31/2021    HDL 64 03/31/2020    HDL 79 04/02/2019     No results found for: PSA  Lab Results   Component Value Date    WBC 5.0 03/31/2021    HGB 14.7 03/31/2021    HCT 43.5 03/31/2021    MCV 85 03/31/2021     03/31/2021     Lab Results   Component Value Date    TSH 1.850 03/31/2021      Lab Results   Component Value Date    BUN 13 03/31/2021    CREATININE 1.21 (H) 03/31/2021    EGFRIFNONA 53 (L) 03/31/2021    EGFRIFAFRI 62 03/31/2021    BCR 11 03/31/2021    K 4.3 03/31/2021    CO2 22 03/31/2021    CALCIUM 9.8 03/31/2021    PROTENTOTREF 7.3 03/31/2021    ALBUMIN 4.8 03/31/2021    LABIL2 1.9 03/31/2021    AST 15 03/31/2021    ALT 10 03/31/2021     No results found for: AILYN, RF, SEDRATE   No results found for: CRP   No results found  for: IRON, TIBC, FERRITIN   No results found for: [id number]     Age-appropriate Screening Schedule:  Refer to the list below for future screening recommendations based on patient's age, sex and/or medical conditions. Orders for these recommended tests are listed in the plan section. The patient has been provided with a written plan.    Health Maintenance   Topic Date Due   • COLONOSCOPY  Never done   • PAP SMEAR  07/19/2019   • INFLUENZA VACCINE  08/01/2021   • TDAP/TD VACCINES (2 - Td) 11/07/2027           Assessment/Plan      Medications        Problem List         LOS      Physical.  Doing well, vaccines current.  Discussed calcium and vitamin D.  Coated baby aspirin daily.  Followed by OB/GYN Dr. Duarte Pap/mammo-current.  Discussed health maintenance, screening test lifestyle modification.  Hypothyroidism.  Good control.  Has had thyroid ultrasound, will get records.  Follow-up recheck.  Vitamin D deficiency.  Improved/normalized with replacement.  Varicose veins.  Mild symptoms currently.  Consider vascular surgery referral if worsening symptoms.  Seborrheic keratosis.  Benign exam, frozen today.  Topical care discussed.  Consider repeat freezing, resection if worsening symptoms.    Diagnoses and all orders for this visit:    1. Encounter for annual general medical examination with abnormal findings in adult (Primary)  -     POCT urinalysis dipstick, manual    2. Elevated blood sugar  -     POCT urinalysis dipstick, manual  -     POC Glycosylated Hemoglobin (Hb A1C)  -     Mammo Screening Digital Tomosynthesis Bilateral With CAD; Future    3. Acquired hypothyroidism  -     POCT urinalysis dipstick, manual  -     Mammo Screening Digital Tomosynthesis Bilateral With CAD; Future    4. Menopausal syndrome    5. Encounter for screening mammogram for malignant neoplasm of breast    6. Overweight              Expected course, medications, and adverse effects discussed.  Call or return if worsening or persistent  symptoms.  I wore protective equipment throughout this patient encounter including a mask, gloves, and eye protection.  Hand hygiene was performed before donning protective equipment and after removal when leaving the room. The complete contents of the Assessment and Plan and Data / Lab Results as documented above have been reviewed and addressed by myself with the patient today as part of an ongoing evaluation / treatment plan.  If some of the documentation has been copied from a previous note and is unchanged it indicates that this problem / plan has been assessed today but is stable from a previous visit and no changes have been recommended.

## 2021-04-12 ENCOUNTER — OFFICE VISIT (OUTPATIENT)
Dept: FAMILY MEDICINE CLINIC | Facility: CLINIC | Age: 46
End: 2021-04-12

## 2021-04-12 VITALS
RESPIRATION RATE: 18 BRPM | SYSTOLIC BLOOD PRESSURE: 110 MMHG | HEIGHT: 61 IN | BODY MASS INDEX: 27.3 KG/M2 | OXYGEN SATURATION: 99 % | HEART RATE: 74 BPM | WEIGHT: 144.6 LBS | DIASTOLIC BLOOD PRESSURE: 84 MMHG | TEMPERATURE: 98 F

## 2021-04-12 DIAGNOSIS — E66.3 OVERWEIGHT: ICD-10-CM

## 2021-04-12 DIAGNOSIS — N95.1 MENOPAUSAL SYNDROME: ICD-10-CM

## 2021-04-12 DIAGNOSIS — R73.9 ELEVATED BLOOD SUGAR: ICD-10-CM

## 2021-04-12 DIAGNOSIS — Z00.01 ENCOUNTER FOR ANNUAL GENERAL MEDICAL EXAMINATION WITH ABNORMAL FINDINGS IN ADULT: Primary | ICD-10-CM

## 2021-04-12 DIAGNOSIS — E03.9 ACQUIRED HYPOTHYROIDISM: ICD-10-CM

## 2021-04-12 DIAGNOSIS — Z12.31 ENCOUNTER FOR SCREENING MAMMOGRAM FOR MALIGNANT NEOPLASM OF BREAST: ICD-10-CM

## 2021-04-12 PROBLEM — L23.7 POISON IVY DERMATITIS: Status: RESOLVED | Noted: 2020-08-06 | Resolved: 2021-04-12

## 2021-04-12 LAB
BILIRUB BLD-MCNC: NEGATIVE MG/DL
CLARITY, POC: CLEAR
COLOR UR: YELLOW
GLUCOSE UR STRIP-MCNC: NEGATIVE MG/DL
HBA1C MFR BLD: 5.4 %
KETONES UR QL: NEGATIVE
LEUKOCYTE EST, POC: NEGATIVE
NITRITE UR-MCNC: NEGATIVE MG/ML
PH UR: 6 [PH] (ref 5–8)
PROT UR STRIP-MCNC: NEGATIVE MG/DL
RBC # UR STRIP: ABNORMAL /UL
SP GR UR: 1 (ref 1–1.03)
UROBILINOGEN UR QL: NORMAL

## 2021-04-12 PROCEDURE — 81002 URINALYSIS NONAUTO W/O SCOPE: CPT | Performed by: FAMILY MEDICINE

## 2021-04-12 PROCEDURE — 83036 HEMOGLOBIN GLYCOSYLATED A1C: CPT | Performed by: FAMILY MEDICINE

## 2021-04-12 PROCEDURE — 99396 PREV VISIT EST AGE 40-64: CPT | Performed by: FAMILY MEDICINE

## 2021-08-20 ENCOUNTER — OFFICE VISIT (OUTPATIENT)
Dept: FAMILY MEDICINE CLINIC | Facility: CLINIC | Age: 46
End: 2021-08-20

## 2021-08-20 ENCOUNTER — TELEPHONE (OUTPATIENT)
Dept: FAMILY MEDICINE CLINIC | Facility: CLINIC | Age: 46
End: 2021-08-20

## 2021-08-20 VITALS — BODY MASS INDEX: 27.19 KG/M2 | HEIGHT: 61 IN | WEIGHT: 144 LBS

## 2021-08-20 DIAGNOSIS — J06.9 ACUTE URI: Primary | ICD-10-CM

## 2021-08-20 DIAGNOSIS — E66.3 OVERWEIGHT: ICD-10-CM

## 2021-08-20 DIAGNOSIS — E03.9 HYPOTHYROIDISM, UNSPECIFIED TYPE: ICD-10-CM

## 2021-08-20 PROCEDURE — 99443 PR PHYS/QHP TELEPHONE EVALUATION 21-30 MIN: CPT | Performed by: FAMILY MEDICINE

## 2021-08-20 RX ORDER — CEPHALEXIN 500 MG/1
500 CAPSULE ORAL 3 TIMES DAILY
Qty: 30 CAPSULE | Refills: 0 | Status: SHIPPED | OUTPATIENT
Start: 2021-08-20 | End: 2022-04-13

## 2021-08-20 NOTE — PROGRESS NOTES
Subjective   Carin Duque is a 45 y.o. female.     Chief Complaint   Patient presents with   • URI       URI   This is a new problem. The current episode started in the past 7 days. The problem has been gradually worsening. There has been no fever. Associated symptoms include coughing, headaches and rhinorrhea. Pertinent negatives include no abdominal pain, chest pain, congestion, diarrhea, dysuria, ear pain, joint pain, joint swelling, nausea, neck pain, plugged ear sensation, rash, sinus pain, sneezing, sore throat, swollen glands, vomiting or wheezing. Associated symptoms comments: PND.            I personally reviewed and updated the patient's allergies, medications, problem list, and past medical, surgical, social, and family history. I have reviewed and confirmed the accuracy of the History of Present Illness and Review of Symptoms as documented by the MA/LPN/RN. Catalino Valentin MD    Family History   Problem Relation Age of Onset   • Cancer Mother    • Hyperlipidemia Mother    • Coronary artery disease Mother        Social History     Tobacco Use   • Smoking status: Never Smoker   • Smokeless tobacco: Never Used   Vaping Use   • Vaping Use: Never used   Substance Use Topics   • Alcohol use: Yes     Comment: Moderate.  Drinks wine   • Drug use: No       Past Surgical History:   Procedure Laterality Date   • BREAST AUGMENTATION      [facility]   •  SECTION      [facility]       Patient Active Problem List   Diagnosis   • Asthma   • Allergic rhinitis   • Carrier of group B Streptococcus   • Elevated blood sugar   • Hypertension, benign   • Hypothyroidism   • Menopausal syndrome   • Overweight   • Request for sterilization   • Encounter for annual general medical examination with abnormal findings in adult   • Encounter for screening mammogram for malignant neoplasm of breast   • Vitamin D deficiency         Current Outpatient Medications:   •  fluticasone (FLONASE) 50 MCG/ACT nasal spray,  "into the nostril(s) as directed by provider., Disp: , Rfl:   •  levothyroxine (SYNTHROID, LEVOTHROID) 125 MCG tablet, TAKE 1 TABLET BY MOUTH EVERY DAY, Disp: 90 tablet, Rfl: 1  •  triamcinolone (KENALOG) 0.1 % cream, Apply  topically to the appropriate area as directed 2 (Two) Times a Day., Disp: 30 g, Rfl: 0  •  cephalexin (KEFLEX) 500 MG capsule, Take 1 capsule by mouth 3 (Three) Times a Day., Disp: 30 capsule, Rfl: 0         Review of Systems   Constitutional: Negative for chills and diaphoresis.   HENT: Positive for rhinorrhea. Negative for congestion, ear pain, sneezing, sore throat and swollen glands.    Eyes: Negative for visual disturbance.   Respiratory: Positive for cough. Negative for shortness of breath and wheezing.    Cardiovascular: Negative for chest pain and palpitations.   Gastrointestinal: Negative for abdominal pain, diarrhea, nausea and vomiting.   Endocrine: Negative for polydipsia and polyphagia.   Genitourinary: Negative for dysuria.   Musculoskeletal: Negative for joint pain, neck pain and neck stiffness.   Skin: Negative for color change, pallor and rash.   Neurological: Negative for seizures and syncope.   Hematological: Negative for adenopathy.       I have reviewed and confirmed the accuracy of the ROS as documented by the MA/LPN/RN Catalino Valentin MD      Objective   Ht 154.9 cm (61\")   Wt 65.3 kg (144 lb)   Breastfeeding No   BMI 27.21 kg/m²   BP Readings from Last 3 Encounters:   04/12/21 110/84   08/06/20 122/76   04/07/20 121/75     Wt Readings from Last 3 Encounters:   08/20/21 65.3 kg (144 lb)   04/12/21 65.6 kg (144 lb 9.6 oz)   08/06/20 65 kg (143 lb 6.4 oz)     Physical Exam    Data / Lab Results:    Hemoglobin A1C   Date Value Ref Range Status   04/12/2021 5.4 % Final   04/03/2018 5.3 4.6 - 7.1 % Final     Lab Results   Component Value Date     (H) 03/31/2021     Lab Results   Component Value Date     (H) 03/31/2021    LDL 85 03/31/2020    LDL 91 04/02/2019 "     Lab Results   Component Value Date    CHOL 184 04/02/2019    CHOL 153 03/27/2018     Lab Results   Component Value Date    TRIG 43 03/31/2021    TRIG 41 03/31/2020    TRIG 59 04/02/2019     Lab Results   Component Value Date    HDL 75 03/31/2021    HDL 64 03/31/2020    HDL 79 04/02/2019     No results found for: PSA  Lab Results   Component Value Date    WBC 5.0 03/31/2021    HGB 14.7 03/31/2021    HCT 43.5 03/31/2021    MCV 85 03/31/2021     03/31/2021     Lab Results   Component Value Date    TSH 1.850 03/31/2021      Lab Results   Component Value Date    BUN 13 03/31/2021    CREATININE 1.21 (H) 03/31/2021    EGFRIFNONA 53 (L) 03/31/2021    EGFRIFAFRI 62 03/31/2021    BCR 11 03/31/2021    K 4.3 03/31/2021    CO2 22 03/31/2021    CALCIUM 9.8 03/31/2021    PROTENTOTREF 7.3 03/31/2021    ALBUMIN 4.8 03/31/2021    LABIL2 1.9 03/31/2021    AST 15 03/31/2021    ALT 10 03/31/2021     No results found for: AILYN, RF, SEDRATE   No results found for: CRP   No results found for: IRON, TIBC, FERRITIN   No results found for: [id number]     Carin Duque consented to undergo a telephone visit today.  This format was necessitated by the current covid-19 virus pandemic.  23 minutes was spent on the phone today with Carin discussing her acute concerns of chronic medical problems.    Assessment/Plan      Medications        Problem List         LOS  Acute bacterial sinusitis.  Start antibiotics.  Increase fluid intake.  Call if you feel worsening symptoms.  Allergic rhinitis.  Stable on antihistamine/nasal steroid.  Health Maint.  Doing well, vaccines current.  Discussed calcium and vitamin D.  Coated baby aspirin daily.  Followed by OB/GYN Dr. Duarte Pap/mammo-current.  Discussed health maintenance, screening test lifestyle modification.  Hypothyroidism.  Good control.  Has had thyroid ultrasound, will get records.  Follow-up recheck.  Vitamin D deficiency.  Improved/normalized with replacement.  Varicose veins.   Mild symptoms currently.  Consider vascular surgery referral if worsening symptoms.  Seborrheic keratosis.  Benign exam, frozen today.  Topical care discussed.  Consider repeat freezing, resection if worsening symptoms.        Problem List Items Addressed This Visit        Unprioritized    Overweight      Other Visit Diagnoses     Acute URI    -  Primary    Relevant Medications    cephalexin (KEFLEX) 500 MG capsule              Expected course, medications, and adverse effects discussed.  Call or return if worsening or persistent symptoms.  The complete contents of the Assessment and Plan and Data / Lab Results as documented above have been reviewed and addressed by myself with the patient today as part of an ongoing evaluation / treatment plan.  If some of the documentation has been copied from a previous note and is unchanged it indicates that this problem / plan has been assessed today but is stable from a previous visit and no changes have been recommended.

## 2021-08-20 NOTE — TELEPHONE ENCOUNTER
Caller: Carin Duque    Relationship: Self    Best call back number: [phone]    What medication are you requesting: ANTIBIOTICS     What are your current symptoms: RESPATORY INFECTION    How long have you been experiencing symptoms: SINCE SUNDAY    Have you had these symptoms before:    [x] Yes  [] No    Have you been treated for these symptoms before:   [x] Yes  [] No    If a prescription is needed, what is your preferred pharmacy and phone number:      [id number] - [place], [place] - [address] - [phone] [facility] [phone] FX   Additional notes:

## 2021-08-23 RX ORDER — LEVOTHYROXINE SODIUM 0.12 MG/1
TABLET ORAL
Qty: 90 TABLET | Refills: 1 | Status: SHIPPED | OUTPATIENT
Start: 2021-08-23 | End: 2022-02-21

## 2022-02-18 DIAGNOSIS — E03.9 HYPOTHYROIDISM, UNSPECIFIED TYPE: ICD-10-CM

## 2022-02-21 RX ORDER — LEVOTHYROXINE SODIUM 0.12 MG/1
TABLET ORAL
Qty: 90 TABLET | Refills: 1 | Status: SHIPPED | OUTPATIENT
Start: 2022-02-21 | End: 2022-08-22

## 2022-04-13 ENCOUNTER — OFFICE VISIT (OUTPATIENT)
Dept: FAMILY MEDICINE CLINIC | Facility: CLINIC | Age: 47
End: 2022-04-13

## 2022-04-13 VITALS
BODY MASS INDEX: 28.25 KG/M2 | OXYGEN SATURATION: 99 % | RESPIRATION RATE: 18 BRPM | TEMPERATURE: 98.2 F | WEIGHT: 149.6 LBS | DIASTOLIC BLOOD PRESSURE: 67 MMHG | SYSTOLIC BLOOD PRESSURE: 112 MMHG | HEART RATE: 81 BPM | HEIGHT: 61 IN

## 2022-04-13 DIAGNOSIS — Z12.31 ENCOUNTER FOR SCREENING MAMMOGRAM FOR MALIGNANT NEOPLASM OF BREAST: ICD-10-CM

## 2022-04-13 DIAGNOSIS — E03.9 ACQUIRED HYPOTHYROIDISM: Primary | ICD-10-CM

## 2022-04-13 DIAGNOSIS — Z12.4 SCREENING FOR MALIGNANT NEOPLASM OF CERVIX: ICD-10-CM

## 2022-04-13 DIAGNOSIS — Z00.00 PHYSICAL EXAM: ICD-10-CM

## 2022-04-13 DIAGNOSIS — E66.3 OVERWEIGHT: ICD-10-CM

## 2022-04-13 DIAGNOSIS — I10 HYPERTENSION, BENIGN: ICD-10-CM

## 2022-04-13 PROCEDURE — 99396 PREV VISIT EST AGE 40-64: CPT | Performed by: FAMILY MEDICINE

## 2022-04-13 NOTE — PROGRESS NOTES
Subjective   Carin Duque is a 46 y.o. female.     Chief Complaint   Patient presents with   • Annual Exam   • Hypothyroidism     Physical exam :   The patient is here: for coordination of medical care to discuss health maintenance and disease prevention to follow up on multiple medical problems.  Last comprehensive physical was on 4/12/21.  Previous physical was performed by  Catalino Valentin MD  Overall has: vigorous activity with work/home activities, exercises 2 - 3 times per week, good appetite, feels well with no complaints, decreased energy level, is sleeping well, is sleeping poorly and shown improvement in their activity level. Nutrition: appropriate balanced diet, balanced diet and eating a variety of foods. Last tetanus shot was 11/7/17.   Patient is doing routine self breast exams: monthly     Hypothyroidism  This is a chronic problem. The current episode started more than 1 year ago. The problem occurs constantly. The problem has been gradually improving. Associated symptoms include fatigue (She states that she has noticed she is more tired recently ). Pertinent negatives include no abdominal pain, anorexia, arthralgias, change in bowel habit, chest pain, chills, congestion, coughing, diaphoresis, fever, headaches, joint swelling, myalgias, nausea, neck pain, numbness, rash, sore throat, swollen glands, urinary symptoms, vertigo, visual change, vomiting or weakness. The symptoms are aggravated by exertion. She has tried nothing for the symptoms. The treatment provided no relief.        Recent Hospitalizations:  No hospitalization(s) within the last year..  ccc      I personally reviewed and updated the patient's allergies, medications, problem list, and past medical, surgical, social, and family history. I have reviewed and confirmed the accuracy of the HPI and ROS as documented by the MA/LPN/RN Catalino Valentin MD    Family History   Problem Relation Age of Onset   • Cancer Mother    •  Hyperlipidemia Mother    • Coronary artery disease Mother        Social History     Tobacco Use   • Smoking status: Never Smoker   • Smokeless tobacco: Never Used   Vaping Use   • Vaping Use: Never used   Substance Use Topics   • Alcohol use: Yes     Comment: Moderate.  Drinks wine   • Drug use: No       Past Surgical History:   Procedure Laterality Date   • AUGMENTATION MAMMAPLASTY     • BREAST AUGMENTATION      [facility]   •  SECTION      [facility]       Patient Active Problem List   Diagnosis   • Asthma   • Allergic rhinitis   • Carrier of group B Streptococcus   • Elevated blood sugar   • Hypertension, benign   • Hypothyroidism   • Menopausal syndrome   • Overweight   • Request for sterilization   • Encounter for annual general medical examination with abnormal findings in adult   • Encounter for screening mammogram for malignant neoplasm of breast   • Vitamin D deficiency         Current Outpatient Medications:   •  fluticasone (FLONASE) 50 MCG/ACT nasal spray, into the nostril(s) as directed by provider., Disp: , Rfl:   •  levothyroxine (SYNTHROID, LEVOTHROID) 125 MCG tablet, TAKE 1 TABLET BY MOUTH EVERY DAY, Disp: 90 tablet, Rfl: 1    Review of Systems   Constitutional: Positive for fatigue (She states that she has noticed she is more tired recently ). Negative for chills, diaphoresis and fever.   HENT: Negative for congestion, sore throat, swollen glands, trouble swallowing and voice change.    Eyes: Negative for visual disturbance.   Respiratory: Negative for cough and shortness of breath.    Cardiovascular: Negative for chest pain and palpitations.   Gastrointestinal: Negative for abdominal pain, anorexia, change in bowel habit, nausea and vomiting.   Endocrine: Negative for polydipsia and polyphagia.   Genitourinary: Negative for hematuria.   Musculoskeletal: Negative for arthralgias, joint swelling, myalgias, neck pain and neck stiffness.   Skin: Negative for color change, pallor and  "rash.   Allergic/Immunologic: Negative for immunocompromised state.   Neurological: Negative for vertigo, seizures, syncope, weakness and numbness.   Hematological: Negative for adenopathy.   Psychiatric/Behavioral: Negative for sleep disturbance and suicidal ideas.       I have reviewed and confirmed the accuracy of the ROS as documented by the MA/LPN/RN Catalino Valentin MD      Objective   /67   Pulse 81   Temp 98.2 °F (36.8 °C)   Resp 18   Ht 154.9 cm (61\")   Wt 67.9 kg (149 lb 9.6 oz)   LMP 03/29/2022   SpO2 99%   BMI 28.27 kg/m²   BP Readings from Last 3 Encounters:   04/13/22 112/67   04/12/21 110/84   08/06/20 122/76     Wt Readings from Last 3 Encounters:   04/13/22 67.9 kg (149 lb 9.6 oz)   08/20/21 65.3 kg (144 lb)   04/12/21 65.6 kg (144 lb 9.6 oz)     Physical Exam  Constitutional:       Appearance: She is well-developed. She is not diaphoretic.   HENT:      Head: Normocephalic.      Right Ear: Tympanic membrane, ear canal and external ear normal.      Left Ear: Tympanic membrane, ear canal and external ear normal.      Nose: Nose normal.   Eyes:      General: Lids are normal.      Conjunctiva/sclera: Conjunctivae normal.      Pupils: Pupils are equal, round, and reactive to light.   Neck:      Thyroid: No thyromegaly.      Vascular: No carotid bruit or JVD.      Trachea: No tracheal deviation.   Cardiovascular:      Rate and Rhythm: Normal rate and regular rhythm.      Heart sounds: Normal heart sounds. No murmur heard.    No friction rub. No gallop.   Pulmonary:      Effort: Pulmonary effort is normal.      Breath sounds: Normal breath sounds. No stridor. No decreased breath sounds, wheezing or rales.   Abdominal:      General: Bowel sounds are normal. There is no distension.      Palpations: Abdomen is soft. There is no mass.      Tenderness: There is no abdominal tenderness. There is no guarding or rebound.      Hernia: No hernia is present.   Lymphadenopathy:      Head:      Right side " of head: No submental, submandibular, tonsillar, preauricular, posterior auricular or occipital adenopathy.      Left side of head: No submental, submandibular, tonsillar, preauricular, posterior auricular or occipital adenopathy.      Cervical: No cervical adenopathy.   Skin:     General: Skin is warm and dry.      Coloration: Skin is not pale.   Neurological:      Mental Status: She is alert and oriented to person, place, and time.      Cranial Nerves: No cranial nerve deficit.      Sensory: No sensory deficit.      Coordination: Coordination normal.      Gait: Gait normal.      Deep Tendon Reflexes: Reflexes are normal and symmetric.         Data / Lab Results:    Hemoglobin A1C   Date Value Ref Range Status   04/12/2021 5.4 % Final   04/03/2018 5.3 4.6 - 7.1 % Final        Lab Results   Component Value Date    LDL 92 04/02/2022     (H) 03/31/2021    LDL 85 03/31/2020     Lab Results   Component Value Date    CHOL 184 04/02/2019    CHOL 153 03/27/2018     Lab Results   Component Value Date    TRIG 40 04/02/2022    TRIG 43 03/31/2021    TRIG 41 03/31/2020     Lab Results   Component Value Date    HDL 68 04/02/2022    HDL 75 03/31/2021    HDL 64 03/31/2020     No results found for: PSA  Lab Results   Component Value Date    WBC 4.7 04/02/2022    HGB 13.5 04/02/2022    HCT 38.7 04/02/2022    MCV 84 04/02/2022     04/02/2022     Lab Results   Component Value Date    TSH 0.811 04/02/2022      Lab Results   Component Value Date    GLUCOSE 98 04/02/2022    BUN 18 04/02/2022    CREATININE 1.14 (H) 04/02/2022    EGFRIFNONA 53 (L) 03/31/2021    EGFRIFAFRI 62 03/31/2021    BCR 16 04/02/2022    K 4.2 04/02/2022    CO2 24 04/02/2022    CALCIUM 9.6 04/02/2022    PROTENTOTREF 6.7 04/02/2022    ALBUMIN 4.2 04/02/2022    LABIL2 1.7 04/02/2022    AST 13 04/02/2022    ALT 6 04/02/2022     No results found for: AILYN, RF, SEDRATE   No results found for: CRP   No results found for: IRON, TIBC, FERRITIN   No results found  for: [id number]     Age-appropriate Screening Schedule:  Refer to the list below for future screening recommendations based on patient's age, sex and/or medical conditions. Orders for these recommended tests are listed in the plan section. The patient has been provided with a written plan.    Health Maintenance   Topic Date Due   • PAP SMEAR  07/19/2019   • INFLUENZA VACCINE  10/01/2022   • TDAP/TD VACCINES (2 - Td or Tdap) 11/07/2027           Assessment & Plan      Medications        Problem List         LOS    Physical.  Doing well, vaccines current.  Discussed calcium and vitamin D.  Coated baby aspirin daily.  Followed by OB/GYN Dr. Duarte, mammogram scheduled.  Discussed health maintenance, screening test lifestyle modification.  Mixed hyperlipidemia.  Improved today, LDL normal.  Discussed diet, exercise cholesterol modification.  Hypothyroidism.  Good control.  Remote history of normal thyroid ultrasound, recheck.  Follow-up recheck.  Vitamin D deficiency.  Improved/normalized with replacement.  Varicose veins.  Mild symptoms currently.  Consider vascular surgery referral if worsening symptoms.  Seborrheic keratosis.  Benign exam, frozen today.  Topical care discussed.  Consider repeat freezing, resection if worsening symptoms.   Allergic rhinitis.  Stable on antihistamine/nasal steroid.      Diagnoses and all orders for this visit:    1. Acquired hypothyroidism (Primary)  -     US Thyroid; Future    2. Physical exam    3. Hypertension, benign    4. Overweight  Assessment & Plan:  Patient's (Body mass index is 28.27 kg/m².) indicates that they are overweight with health conditions that include hypertension . Weight is unchanged. BMI is is above average; BMI management plan is completed. We discussed portion control and increasing exercise.       5. Encounter for screening mammogram for malignant neoplasm of breast  -     Mammo Screening Digital Tomosynthesis Bilateral With CAD    6. Screening for malignant  neoplasm of cervix            Expected course, medications, and adverse effects discussed.  Call or return if worsening or persistent symptoms.  I wore protective equipment throughout this patient encounter including a mask, gloves, and eye protection.  Hand hygiene was performed before donning protective equipment and after removal when leaving the room. The complete contents of the Assessment and Plan and Data / Lab Results as documented above have been reviewed and addressed by myself with the patient today as part of an ongoing evaluation / treatment plan.  If some of the documentation has been copied from a previous note and is unchanged it indicates that this problem / plan has been assessed today but is stable from a previous visit and no changes have been recommended.

## 2022-04-26 ENCOUNTER — HOSPITAL ENCOUNTER (OUTPATIENT)
Dept: ULTRASOUND IMAGING | Facility: HOSPITAL | Age: 47
Discharge: HOME | End: 2022-04-26

## 2022-04-26 ENCOUNTER — HOSPITAL ENCOUNTER (OUTPATIENT)
Dept: MAMMOGRAPHY | Facility: HOSPITAL | Age: 47
Discharge: HOME | End: 2022-04-26

## 2022-04-26 DIAGNOSIS — E03.9 ACQUIRED HYPOTHYROIDISM: ICD-10-CM

## 2022-04-26 PROCEDURE — 76536 US EXAM OF HEAD AND NECK: CPT

## 2022-04-26 PROCEDURE — 77067 SCR MAMMO BI INCL CAD: CPT

## 2022-04-26 PROCEDURE — 77063 BREAST TOMOSYNTHESIS BI: CPT

## 2022-05-19 ENCOUNTER — TELEPHONE (OUTPATIENT)
Dept: FAMILY MEDICINE CLINIC | Facility: CLINIC | Age: 47
End: 2022-05-19

## 2022-05-19 NOTE — TELEPHONE ENCOUNTER
Returned phone call and left voicemail to let her know Dr. Zheng has appt's available tomorrow morning, or she can follow up with Dr. Valentin on Friday.

## 2022-05-19 NOTE — TELEPHONE ENCOUNTER
Caller: Carin Duque    Relationship: Self    Best call back number: [phone]     What medication are you requesting: ANTIBIOTIC    What are your current symptoms: TICK BITE ON RIGHT SIDE OF KNEE    How long have you been experiencing symptoms: RED ITCHY BUMP 2 DAYS    Have you had these symptoms before:    [x] Yes  [] No    Have you been treated for these symptoms before:   [x] Yes  [] No    If a prescription is needed, what is your preferred pharmacy and phone number: [id number] - [place], [place] - [address] - [phone] [facility] [phone] [place]     Additional notes: IF NEEDED CAN SCHEDULE A MYCHART VIDEO VISIT BUT WOULD JUST LIKE IT CALLED IN.

## 2022-07-18 ENCOUNTER — TELEMEDICINE (OUTPATIENT)
Dept: FAMILY MEDICINE CLINIC | Facility: TELEHEALTH | Age: 47
End: 2022-07-18

## 2022-07-18 DIAGNOSIS — Z20.822 ENCOUNTER BY TELEHEALTH FOR SUSPECTED COVID-19: Primary | ICD-10-CM

## 2022-07-18 PROCEDURE — 99213 OFFICE O/P EST LOW 20 MIN: CPT | Performed by: NURSE PRACTITIONER

## 2022-07-18 RX ORDER — GUAIFENESIN 600 MG/1
600-1200 TABLET, EXTENDED RELEASE ORAL 2 TIMES DAILY PRN
Qty: 48 TABLET | Refills: 0 | Status: SHIPPED | OUTPATIENT
Start: 2022-07-18

## 2022-07-18 NOTE — PROGRESS NOTES
Subjective   Carin Duque is a 46 y.o. female.     Her  has Covid-19. Her symptoms started yesterday with chest congestion, fever, chills body aches, fatigue. She did a home test for covid-19 which was negative. She does not think she is at high risk for severe illness. She has been using tylenol and motrin and it helps with body aches.        The following portions of the patient's history were reviewed and updated as appropriate: allergies, current medications, past family history, past medical history, past social history, past surgical history, and problem list.    Review of Systems   Constitutional: Positive for fatigue and fever (tmax 101.5).   HENT: Negative.    Respiratory: Positive for cough (mildly productive) and chest tightness (mild congestion). Negative for shortness of breath and wheezing.    Gastrointestinal: Negative.    Musculoskeletal: Positive for myalgias.   Neurological: Positive for weakness and headache.       Objective   Physical Exam  Constitutional:       General: She is not in acute distress.     Appearance: She is well-developed. She is not diaphoretic.   Pulmonary:      Effort: Pulmonary effort is normal.   Neurological:      Mental Status: She is alert and oriented to person, place, and time.   Psychiatric:         Behavior: Behavior normal.           Assessment & Plan   Diagnoses and all orders for this visit:    1. Encounter by telehealth for suspected COVID-19 (Primary)  -     guaiFENesin (Mucinex) 600 MG 12 hr tablet; Take 1-2 tablets by mouth 2 (Two) Times a Day As Needed for Cough.  Dispense: 48 tablet; Refill: 0          PCR testing offered, pt declined       The use of a video visit has been reviewed with the patient and verbal informed consent has been obtained. Myself and Carin Duque participated in this visit. The patient is located in [place], [place]. I am located in [place], [place]. Mychart and Zoom were utilized. I spent 10 minutes in the patient's chart for  this visit.

## 2022-07-18 NOTE — PATIENT INSTRUCTIONS
Please quarantine for 10 days since symptoms first appeared. If symptoms fully resolve, isolation may be shortened and end after day 5 on the first day without symptoms. Wear a well-fitting face mask for 10 full days since the start of symptoms. Isolation should not be shortened if a mask cannot be worn properly and consistently. If you are a healthcare worker, you should not shorten your quarantine period unless advised so by employee health.

## 2022-08-22 DIAGNOSIS — E03.9 HYPOTHYROIDISM, UNSPECIFIED TYPE: ICD-10-CM

## 2022-08-22 RX ORDER — LEVOTHYROXINE SODIUM 0.12 MG/1
TABLET ORAL
Qty: 90 TABLET | Refills: 1 | Status: SHIPPED | OUTPATIENT
Start: 2022-08-22 | End: 2023-02-20

## 2022-12-02 RX ORDER — OSELTAMIVIR PHOSPHATE 75 MG/1
75 CAPSULE ORAL DAILY
Qty: 10 CAPSULE | Refills: 0 | Status: SHIPPED | OUTPATIENT
Start: 2022-12-02

## 2023-02-20 DIAGNOSIS — E03.9 HYPOTHYROIDISM, UNSPECIFIED TYPE: ICD-10-CM

## 2023-02-20 RX ORDER — LEVOTHYROXINE SODIUM 0.12 MG/1
TABLET ORAL
Qty: 90 TABLET | Refills: 1 | Status: SHIPPED | OUTPATIENT
Start: 2023-02-20

## 2023-04-18 NOTE — PROGRESS NOTES
Subjective   Carin Duque is a 47 y.o. female.     Chief Complaint   Patient presents with   • Annual Exam   • Hypertension   • Hypothyroidism   • Shoulder Pain       The patient is here: to discuss health maintenance and disease prevention.  Last comprehensive physical was on 4/13/2022.  Previous physical was performed by  Catalino Valentin MD  Overall has: moderate activity with work/home activities, exercises 2 - 3 times per week, good appetite, feels well with minor complaints, good energy level, is sleeping poorly and returned to full activity. Nutrition: appropriate balanced diet, balanced diet and eating a variety of foods. Last tetanus shot was 11/7/2017. Patient is doing routine self breast exams: monthly Patient's last Mammogram was on 4/21/2022. Followed by GYN, Dr Duarte and last PAP was 3/14/2018.      History of Present Illness  Carin would like to discuss right shoulder pain. She states that she notices it starting hurting since September 2022, after lifting weights. Pain can increase up to 8/10, depending on what she is doing. Aggravated by movement, lifting, and position. Pain is described as stabbing and aches. Radiates from shoulder joint to front of shoulder. Treatments tylenol, ice, rest, stretches and massage therapist. Nothing seems to help.  Hypertension  This is a chronic problem. The current episode started more than 1 year ago. Pertinent negatives include no chest pain, headaches, neck pain, palpitations or shortness of breath. There are no associated agents to hypertension. Risk factors for coronary artery disease include obesity. Past treatments include nothing. Current antihypertension treatment includes nothing. Identifiable causes of hypertension include a thyroid problem.   Thyroid Problem  Presents for follow-up visit. Symptoms include fatigue (She states that she has noticed she is more tired recently ). Patient reports no diaphoresis, palpitations or visual change.         Recent Hospitalizations:  No hospitalization(s) within the last year..  ccc      I personally reviewed and updated the patient's allergies, medications, problem list, and past medical, surgical, social, and family history. I have reviewed and confirmed the accuracy of the HPI and ROS as documented by the MA/LPN/RN Catalino Valentin MD    Family History   Problem Relation Age of Onset   • Cancer Mother    • Hyperlipidemia Mother    • Coronary artery disease Mother    • COPD Father    • Heart disease Father    • Vision loss Father        Social History     Tobacco Use   • Smoking status: Never   • Smokeless tobacco: Never   Vaping Use   • Vaping Use: Never used   Substance Use Topics   • Alcohol use: Yes     Comment: Moderate.  Drinks wine   • Drug use: No       Past Surgical History:   Procedure Laterality Date   • AUGMENTATION MAMMAPLASTY     • BREAST AUGMENTATION      [facility]   •  SECTION      [facility]   • TUBAL ABDOMINAL LIGATION  2018       Patient Active Problem List   Diagnosis   • Asthma   • Allergic rhinitis   • Carrier of group B Streptococcus   • Elevated blood sugar   • Hypertension, benign   • Hypothyroidism   • Menopausal syndrome   • Overweight with body mass index (BMI) of 28 to 28.9 in adult   • Request for sterilization   • Encounter for annual general medical examination with abnormal findings in adult   • Encounter for screening mammogram for malignant neoplasm of breast   • Vitamin D deficiency   • Tendinitis of left rotator cuff         Current Outpatient Medications:   •  fluticasone (FLONASE) 50 MCG/ACT nasal spray, into the nostril(s) as directed by provider., Disp: , Rfl:   •  levothyroxine (SYNTHROID, LEVOTHROID) 125 MCG tablet, TAKE 1 TABLET BY MOUTH EVERY DAY, Disp: 90 tablet, Rfl: 1    Review of Systems   Constitutional: Positive for fatigue (She states that she has noticed she is more tired recently ). Negative for chills, diaphoresis and fever.   HENT: Negative for  "congestion, sore throat, swollen glands, trouble swallowing and voice change.    Eyes: Negative for visual disturbance.   Respiratory: Negative for cough and shortness of breath.    Cardiovascular: Negative for chest pain and palpitations.   Gastrointestinal: Negative for abdominal pain, anorexia, change in bowel habit, nausea and vomiting.   Endocrine: Negative for polydipsia and polyphagia.   Genitourinary: Negative for hematuria.   Musculoskeletal: Negative for arthralgias, joint swelling, myalgias, neck pain and neck stiffness.   Skin: Negative for color change, pallor and rash.   Allergic/Immunologic: Negative for immunocompromised state.   Neurological: Negative for vertigo, seizures, syncope, weakness and numbness.   Hematological: Negative for adenopathy.   Psychiatric/Behavioral: Negative for hallucinations, sleep disturbance and suicidal ideas.       I have reviewed and confirmed the accuracy of the ROS as documented by the MA/LPN/RN Catalino Valentin MD      Objective   /74 (BP Location: Left arm, Patient Position: Sitting, Cuff Size: Adult)   Pulse 80   Temp 98.4 °F (36.9 °C) (Temporal)   Resp 18   Ht 154.9 cm (61\")   Wt 69.1 kg (152 lb 6.4 oz)   LMP 03/19/2023 (Approximate)   SpO2 97%   BMI 28.80 kg/m²   BP Readings from Last 3 Encounters:   04/19/23 126/74   04/13/22 112/67   04/12/21 110/84     Wt Readings from Last 3 Encounters:   04/19/23 69.1 kg (152 lb 6.4 oz)   04/13/22 67.9 kg (149 lb 9.6 oz)   08/20/21 65.3 kg (144 lb)     Physical Exam  Constitutional:       Appearance: She is well-developed. She is not diaphoretic.   HENT:      Head: Normocephalic.      Right Ear: Tympanic membrane, ear canal and external ear normal.      Left Ear: Tympanic membrane, ear canal and external ear normal.      Nose: Nose normal.   Eyes:      General: Lids are normal.      Conjunctiva/sclera: Conjunctivae normal.      Pupils: Pupils are equal, round, and reactive to light.   Neck:      Thyroid: No " thyromegaly.      Vascular: No carotid bruit or JVD.      Trachea: No tracheal deviation.   Cardiovascular:      Rate and Rhythm: Normal rate and regular rhythm.      Heart sounds: Normal heart sounds. No murmur heard.    No friction rub. No gallop.   Pulmonary:      Effort: Pulmonary effort is normal.      Breath sounds: Normal breath sounds. No stridor. No decreased breath sounds, wheezing or rales.   Abdominal:      General: Bowel sounds are normal. There is no distension.      Palpations: Abdomen is soft. There is no mass.      Tenderness: There is no abdominal tenderness. There is no guarding or rebound.      Hernia: No hernia is present.   Musculoskeletal:      Right shoulder: Normal. No swelling, deformity, effusion or crepitus. Normal range of motion. Normal strength.      Left shoulder: Normal. No swelling, deformity, effusion, tenderness or crepitus. Normal range of motion. Normal strength.      Cervical back: No swelling, deformity, spasms or tenderness.   Lymphadenopathy:      Head:      Right side of head: No submental, submandibular, tonsillar, preauricular, posterior auricular or occipital adenopathy.      Left side of head: No submental, submandibular, tonsillar, preauricular, posterior auricular or occipital adenopathy.      Cervical: No cervical adenopathy.   Skin:     General: Skin is warm and dry.      Coloration: Skin is not pale.   Neurological:      Mental Status: She is alert and oriented to person, place, and time.      Cranial Nerves: No cranial nerve deficit.      Sensory: No sensory deficit.      Coordination: Coordination normal.      Gait: Gait normal.      Deep Tendon Reflexes: Reflexes are normal and symmetric.         Data / Lab Results:    Hemoglobin A1C   Date Value Ref Range Status   04/12/2021 5.4 % Final   04/03/2018 5.3 4.6 - 7.1 % Final        Lab Results   Component Value Date    LDL 98 04/14/2023    LDL 92 04/02/2022     (H) 03/31/2021     Lab Results   Component  Value Date    CHOL 184 04/02/2019    CHOL 153 03/27/2018     Lab Results   Component Value Date    TRIG 49 04/14/2023    TRIG 40 04/02/2022    TRIG 43 03/31/2021     Lab Results   Component Value Date    HDL 71 04/14/2023    HDL 68 04/02/2022    HDL 75 03/31/2021     No results found for: PSA  Lab Results   Component Value Date    WBC 4.7 04/14/2023    HGB 13.5 04/14/2023    HCT 40.1 04/14/2023    MCV 85 04/14/2023     04/14/2023     Lab Results   Component Value Date    TSH 1.240 04/14/2023     Lab Results   Component Value Date    GLUCOSE 100 (H) 04/14/2023    BUN 18 04/14/2023    CREATININE 1.10 (H) 04/14/2023    EGFRIFNONA 53 (L) 03/31/2021    EGFRIFAFRI 62 03/31/2021    BCR 16 04/14/2023    K 4.3 04/14/2023    CO2 24 04/14/2023    CALCIUM 9.7 04/14/2023    PROTENTOTREF 6.9 04/14/2023    ALBUMIN 4.5 04/14/2023    LABIL2 1.9 04/14/2023    AST 13 04/14/2023    ALT 10 04/14/2023     No results found for: AILYN, RF, SEDRATE   No results found for: CRP   No results found for: IRON, TIBC, FERRITIN   No results found for: [id number]     Age-appropriate Screening Schedule:  Refer to the list below for future screening recommendations based on patient's age, sex and/or medical conditions. Orders for these recommended tests are listed in the plan section. The patient has been provided with a written plan.    Health Maintenance   Topic Date Due   • COLORECTAL CANCER SCREENING  Never done   • COVID-19 Vaccine (1) Never done   • Pneumococcal Vaccine 0-64 (1 - PCV) Never done   • HEPATITIS C SCREENING  Never done   • PAP SMEAR  03/14/2021   • INFLUENZA VACCINE  08/01/2023   • ANNUAL PHYSICAL  04/19/2024   • TDAP/TD VACCINES (2 - Td or Tdap) 11/07/2027           Assessment & Plan   Joint injection  Injection site: Right Shoulder  Date/Time:@  13:22 EDT   Performed by: Catalino Valentin MD   Authorized by: Catalino Valentin MD   Preparation: Patient was prepped and draped in the usual sterile fashion.  Anesthesia:  Local  anesthesia used: no  Sedation:  Patient sedated: no     Medications Used:  0.5cc  Marcaine: NDC- 2034-2627-39 Lot- [id number]  0.5cc Lidocaine 10mg/mL: NDC- 36357-773-80 Lot- [id number] EXP- 02/2026  1cc DepoMedrol 40mg: NDC- 5744-2621-52 Lot-  [id number] EXP-  04/2024     Carin Duque  tolerated procedure well.    Medications        Problem List         LOS  Physical.  Doing well, vaccines current.  Discussed calcium and vitamin D.  Coated baby aspirin daily.  Followed by OB/GYN Dr. Duarte, mammogram scheduled.  Discussed health maintenance, screening test lifestyle modification.  Mixed hyperlipidemia.  Improved today, LDL normal.  Discussed diet, exercise cholesterol modification.  Hypothyroidism.  Good control.  Remote history of normal thyroid ultrasound, repeat 2022 with thyroiditis, no nodules..  Follow-up recheck.  Vitamin D deficiency.  Improved/normalized with replacement.  Varicose veins.  Mild symptoms currently.  Consider vascular surgery referral if worsening symptoms.  Seborrheic keratosis.  Improved/resolved status post freezing   Allergic rhinitis.  Stable on antihistamine/nasal steroid.  Rotator cuff tendinitis.  Right.  Injected today.  Expected course discussed.  Consider imaging if persistent symptoms.    Diagnoses and all orders for this visit:    1. Encounter for annual general medical examination with abnormal findings in adult (Primary)  -     POCT urinalysis dipstick, manual    2. Hypertension, benign    3. Acquired hypothyroidism    4. Acute pain of right shoulder  -     methylPREDNISolone acetate (DEPO-medrol) injection 40 mg    5. Overweight with body mass index (BMI) of 28 to 28.9 in adult  Assessment & Plan:  Patient's (Body mass index is 28.8 kg/m².) indicates that they are overweight with health conditions that include hypertension . Weight is unchanged. BMI is is above average; BMI management plan is completed. We discussed portion control and increasing exercise.       6.  Encounter for screening mammogram for malignant neoplasm of breast    7. Screening for malignant neoplasm of cervix    8. Screening for malignant neoplasm of colon    9. Tendinitis of left rotator cuff            Expected course, medications, and adverse effects discussed.  Call or return if worsening or persistent symptoms.  I wore protective equipment throughout this patient encounter including a mask, gloves, and eye protection.  Hand hygiene was performed before donning protective equipment and after removal when leaving the room. The complete contents of the Assessment and Plan and Data / Lab Results as documented above have been reviewed and addressed by myself with the patient today as part of an ongoing evaluation / treatment plan.  If some of the documentation has been copied from a previous note and is unchanged it indicates that this problem / plan has been assessed today but is stable from a previous visit and no changes have been recommended.  The separate E/M service provided today is significant, medically necessary, and separately identifiable.

## 2023-04-19 ENCOUNTER — OFFICE VISIT (OUTPATIENT)
Dept: FAMILY MEDICINE CLINIC | Facility: CLINIC | Age: 48
End: 2023-04-19
Payer: COMMERCIAL

## 2023-04-19 VITALS
DIASTOLIC BLOOD PRESSURE: 74 MMHG | HEART RATE: 80 BPM | HEIGHT: 61 IN | SYSTOLIC BLOOD PRESSURE: 126 MMHG | OXYGEN SATURATION: 97 % | TEMPERATURE: 98.4 F | RESPIRATION RATE: 18 BRPM | WEIGHT: 152.4 LBS | BODY MASS INDEX: 28.77 KG/M2

## 2023-04-19 DIAGNOSIS — Z12.4 SCREENING FOR MALIGNANT NEOPLASM OF CERVIX: ICD-10-CM

## 2023-04-19 DIAGNOSIS — M25.511 ACUTE PAIN OF RIGHT SHOULDER: ICD-10-CM

## 2023-04-19 DIAGNOSIS — Z00.01 ENCOUNTER FOR ANNUAL GENERAL MEDICAL EXAMINATION WITH ABNORMAL FINDINGS IN ADULT: Primary | ICD-10-CM

## 2023-04-19 DIAGNOSIS — M75.82 TENDINITIS OF LEFT ROTATOR CUFF: ICD-10-CM

## 2023-04-19 DIAGNOSIS — E66.3 OVERWEIGHT WITH BODY MASS INDEX (BMI) OF 28 TO 28.9 IN ADULT: ICD-10-CM

## 2023-04-19 DIAGNOSIS — Z12.11 SCREENING FOR MALIGNANT NEOPLASM OF COLON: ICD-10-CM

## 2023-04-19 DIAGNOSIS — I10 HYPERTENSION, BENIGN: ICD-10-CM

## 2023-04-19 DIAGNOSIS — E03.9 ACQUIRED HYPOTHYROIDISM: ICD-10-CM

## 2023-04-19 DIAGNOSIS — Z12.31 ENCOUNTER FOR SCREENING MAMMOGRAM FOR MALIGNANT NEOPLASM OF BREAST: ICD-10-CM

## 2023-04-19 RX ORDER — METHYLPREDNISOLONE ACETATE 40 MG/ML
40 INJECTION, SUSPENSION INTRA-ARTICULAR; INTRALESIONAL; INTRAMUSCULAR; SOFT TISSUE ONCE
Status: COMPLETED | OUTPATIENT
Start: 2023-04-19 | End: 2023-04-19

## 2023-04-19 RX ADMIN — METHYLPREDNISOLONE ACETATE 40 MG: 40 INJECTION, SUSPENSION INTRA-ARTICULAR; INTRALESIONAL; INTRAMUSCULAR; SOFT TISSUE at 11:47

## 2023-04-19 NOTE — ASSESSMENT & PLAN NOTE
Patient's (Body mass index is 28.8 kg/m².) indicates that they are overweight with health conditions that include hypertension . Weight is unchanged. BMI is is above average; BMI management plan is completed. We discussed portion control and increasing exercise.

## 2023-05-05 PROBLEM — M75.82 TENDINITIS OF LEFT ROTATOR CUFF: Status: ACTIVE | Noted: 2023-05-05

## 2023-08-24 DIAGNOSIS — E03.9 HYPOTHYROIDISM, UNSPECIFIED TYPE: ICD-10-CM

## 2023-08-24 RX ORDER — LEVOTHYROXINE SODIUM 0.12 MG/1
TABLET ORAL
Qty: 90 TABLET | Refills: 1 | Status: SHIPPED | OUTPATIENT
Start: 2023-08-24

## 2024-02-22 DIAGNOSIS — E03.9 HYPOTHYROIDISM, UNSPECIFIED TYPE: ICD-10-CM

## 2024-02-23 RX ORDER — LEVOTHYROXINE SODIUM 0.12 MG/1
TABLET ORAL
Qty: 90 TABLET | Refills: 1 | Status: SHIPPED | OUTPATIENT
Start: 2024-02-23

## 2024-04-24 ENCOUNTER — OFFICE VISIT (OUTPATIENT)
Dept: FAMILY MEDICINE CLINIC | Facility: CLINIC | Age: 49
End: 2024-04-24
Payer: COMMERCIAL

## 2024-04-24 VITALS
HEIGHT: 61 IN | OXYGEN SATURATION: 100 % | RESPIRATION RATE: 16 BRPM | WEIGHT: 157.6 LBS | DIASTOLIC BLOOD PRESSURE: 72 MMHG | TEMPERATURE: 98.2 F | SYSTOLIC BLOOD PRESSURE: 122 MMHG | HEART RATE: 76 BPM | BODY MASS INDEX: 29.76 KG/M2

## 2024-04-24 DIAGNOSIS — Z12.4 SCREENING FOR MALIGNANT NEOPLASM OF CERVIX: ICD-10-CM

## 2024-04-24 DIAGNOSIS — R53.83 MALAISE AND FATIGUE: ICD-10-CM

## 2024-04-24 DIAGNOSIS — Z12.31 ENCOUNTER FOR SCREENING MAMMOGRAM FOR MALIGNANT NEOPLASM OF BREAST: ICD-10-CM

## 2024-04-24 DIAGNOSIS — R07.89 OTHER CHEST PAIN: ICD-10-CM

## 2024-04-24 DIAGNOSIS — E55.9 VITAMIN D DEFICIENCY: ICD-10-CM

## 2024-04-24 DIAGNOSIS — E03.9 ACQUIRED HYPOTHYROIDISM: ICD-10-CM

## 2024-04-24 DIAGNOSIS — J30.89 ALLERGIC RHINITIS DUE TO OTHER ALLERGIC TRIGGER, UNSPECIFIED SEASONALITY: ICD-10-CM

## 2024-04-24 DIAGNOSIS — E66.3 OVERWEIGHT WITH BODY MASS INDEX (BMI) OF 29 TO 29.9 IN ADULT: ICD-10-CM

## 2024-04-24 DIAGNOSIS — R53.81 MALAISE AND FATIGUE: ICD-10-CM

## 2024-04-24 DIAGNOSIS — M25.59 PAIN IN OTHER JOINT: ICD-10-CM

## 2024-04-24 DIAGNOSIS — Z12.11 SCREENING FOR MALIGNANT NEOPLASM OF COLON: ICD-10-CM

## 2024-04-24 DIAGNOSIS — Z00.01 ENCOUNTER FOR ANNUAL GENERAL MEDICAL EXAMINATION WITH ABNORMAL FINDINGS IN ADULT: Primary | ICD-10-CM

## 2024-04-24 DIAGNOSIS — I10 HYPERTENSION, BENIGN: ICD-10-CM

## 2024-04-24 DIAGNOSIS — N28.9 RENAL INSUFFICIENCY: ICD-10-CM

## 2024-04-24 LAB
BILIRUB BLD-MCNC: NEGATIVE MG/DL
CLARITY, POC: CLEAR
COLOR UR: YELLOW
GLUCOSE UR STRIP-MCNC: NEGATIVE MG/DL
KETONES UR QL: NEGATIVE
LEUKOCYTE EST, POC: NEGATIVE
NITRITE UR-MCNC: NEGATIVE MG/ML
PH UR: 6 [PH] (ref 5–8)
PROT UR STRIP-MCNC: NEGATIVE MG/DL
RBC # UR STRIP: ABNORMAL /UL
SP GR UR: 1.01 (ref 1–1.03)
UROBILINOGEN UR QL: NORMAL

## 2024-04-24 PROCEDURE — 99213 OFFICE O/P EST LOW 20 MIN: CPT | Performed by: FAMILY MEDICINE

## 2024-04-24 PROCEDURE — 93000 ELECTROCARDIOGRAM COMPLETE: CPT | Performed by: FAMILY MEDICINE

## 2024-04-24 PROCEDURE — 99396 PREV VISIT EST AGE 40-64: CPT | Performed by: FAMILY MEDICINE

## 2024-04-24 PROCEDURE — 81002 URINALYSIS NONAUTO W/O SCOPE: CPT | Performed by: FAMILY MEDICINE

## 2024-04-24 RX ORDER — LEVOCETIRIZINE DIHYDROCHLORIDE 5 MG/1
5 TABLET, FILM COATED ORAL DAILY
Qty: 90 TABLET | Refills: 3 | Status: SHIPPED | OUTPATIENT
Start: 2024-04-24

## 2024-07-08 ENCOUNTER — HOSPITAL ENCOUNTER (OUTPATIENT)
Dept: ULTRASOUND IMAGING | Facility: HOSPITAL | Age: 49
Discharge: HOME | End: 2024-07-08
Admitting: FAMILY MEDICINE
Payer: COMMERCIAL

## 2024-07-08 DIAGNOSIS — N28.9 RENAL INSUFFICIENCY: ICD-10-CM

## 2024-07-08 PROCEDURE — 76775 US EXAM ABDO BACK WALL LIM: CPT

## 2024-08-21 DIAGNOSIS — E03.9 HYPOTHYROIDISM, UNSPECIFIED TYPE: ICD-10-CM

## 2024-08-21 RX ORDER — LEVOTHYROXINE SODIUM 0.12 MG/1
TABLET ORAL
Qty: 90 TABLET | Refills: 1 | Status: SHIPPED | OUTPATIENT
Start: 2024-08-21

## 2025-02-20 DIAGNOSIS — E03.9 HYPOTHYROIDISM, UNSPECIFIED TYPE: ICD-10-CM

## 2025-02-21 RX ORDER — LEVOTHYROXINE SODIUM 125 UG/1
TABLET ORAL
Qty: 90 TABLET | Refills: 1 | Status: SHIPPED | OUTPATIENT
Start: 2025-02-21

## 2025-04-26 DIAGNOSIS — J30.89 ALLERGIC RHINITIS DUE TO OTHER ALLERGIC TRIGGER, UNSPECIFIED SEASONALITY: ICD-10-CM

## 2025-04-28 RX ORDER — LEVOCETIRIZINE DIHYDROCHLORIDE 5 MG/1
5 TABLET, FILM COATED ORAL DAILY
Qty: 90 TABLET | Refills: 3 | Status: SHIPPED | OUTPATIENT
Start: 2025-04-28

## 2025-06-02 NOTE — PROGRESS NOTES
Subjective   Carin Duque is a 49 y.o. female.     Chief Complaint   Patient presents with    Annual Exam    Hypertension    Hypothyroidism    Hip Pain     Right       The patient is here: to discuss health maintenance and disease prevention to follow up on multiple medical problems.  Last comprehensive physical was on 4/24/2024.  Previous physical was performed by  Catalino Valentin MD  Overall has: moderate activity with work/home activities, exercises 2 - 3 times per week, good appetite, feels well with minor complaints, good energy level, and is sleeping poorly. Nutrition: appropriate balanced diet and eating a variety of foods. Last tetanus shot was 11/7/2017. Patient is doing routine self breast exams: occasionally    Hypertension  This is a chronic problem. The current episode started more than 1 year ago. Pertinent negatives include no chest pain, palpitations or shortness of breath. There are no associated agents to hypertension. Risk factors for coronary artery disease include obesity. Past treatments include nothing. Current antihypertension treatment includes nothing. There are no compliance problems.  Identifiable causes of hypertension include a thyroid problem.   Thyroid Problem  Presents for follow-up visit. Patient reports no cold intolerance, diaphoresis, heat intolerance or palpitations. Fatigue: She states that she has noticed she is more tired recently .  Hip Pain   The incident occurred more than 1 week ago (since last summer). Incident location: no incident. There was no injury mechanism. The pain is present in the right hip (Top of the right greater trochanter). The quality of the pain is described as aching, stabbing and shooting. The pain is at a severity of 6/10. The pain is moderate. The pain has been Fluctuating since onset. Associated symptoms include an inability to bear weight. Associated symptoms comments: Patient states she can not put weight on that hip or sleep on the right  side.. She reports no foreign bodies present. The symptoms are aggravated by movement and weight bearing. She has tried acetaminophen for the symptoms. The treatment provided mild relief.        Recent Hospitalizations:  No hospitalization(s) within the last year..  ccc      I personally reviewed and updated the patient's allergies, medications, problem list, and past medical, surgical, social, and family history. I have reviewed and confirmed the accuracy of the HPI and ROS as documented by the MA/LPN/RN Catalino Valentin MD    Family History   Problem Relation Age of Onset    Cancer Mother     Hyperlipidemia Mother     Coronary artery disease Mother     Heart disease Mother     Stroke Mother         mini strokes    Thyroid disease Mother     Hypertension Mother     COPD Father     Heart disease Father     Vision loss Father     Hearing loss Father     Hypertension Father     Diabetes Maternal Grandfather        Social History     Tobacco Use    Smoking status: Never    Smokeless tobacco: Never   Vaping Use    Vaping status: Never Used   Substance Use Topics    Alcohol use: Yes     Comment: Moderate.  Drinks wine    Drug use: No       Past Surgical History:   Procedure Laterality Date    AUGMENTATION MAMMAPLASTY      BREAST AUGMENTATION      [facility]     SECTION      [facility]    TUBAL ABDOMINAL LIGATION  2018       Patient Active Problem List   Diagnosis    Asthma    Allergic rhinitis    Carrier of group B Streptococcus    Elevated blood sugar    Hypertension, benign    Hypothyroidism    Menopausal syndrome    Overweight with body mass index (BMI) of 29 to 29.9 in adult    Request for sterilization    Encounter for annual general medical examination with abnormal findings in adult    Encounter for screening mammogram for malignant neoplasm of breast    Vitamin D deficiency    Tendinitis of left rotator cuff    Renal insufficiency         Current Outpatient Medications:     levocetirizine (XYZAL) 5  "MG tablet, TAKE 1 TABLET BY MOUTH EVERY DAY, Disp: 90 tablet, Rfl: 3    levothyroxine (SYNTHROID, LEVOTHROID) 125 MCG tablet, TAKE 1 TABLET BY MOUTH EVERY DAY, Disp: 90 tablet, Rfl: 1    predniSONE (DELTASONE) 5 MG tablet, 40mg x 3 days, 20mg x 3 days, 10mg x 3 days, 5mg x 3 days, Disp: 45 tablet, Rfl: 0    Review of Systems   Constitutional:  Negative for chills and diaphoresis. Fatigue: She states that she has noticed she is more tired recently .  HENT:  Negative for trouble swallowing and voice change.    Eyes:  Negative for visual disturbance.   Respiratory:  Negative for shortness of breath.    Cardiovascular:  Negative for chest pain and palpitations.   Gastrointestinal:  Negative for abdominal pain and nausea.   Endocrine: Negative for cold intolerance, heat intolerance, polydipsia and polyphagia.   Genitourinary:  Negative for hematuria.   Musculoskeletal:  Negative for neck stiffness.   Skin:  Negative for color change and pallor.   Allergic/Immunologic: Negative for immunocompromised state.   Neurological:  Negative for seizures and syncope.   Hematological:  Negative for adenopathy.   Psychiatric/Behavioral:  Negative for hallucinations, sleep disturbance and suicidal ideas.        I have reviewed and confirmed the accuracy of the ROS as documented by the MA/LPN/RN Catalino Valentin MD      Objective   /82 (BP Location: Right arm, Patient Position: Sitting, Cuff Size: Adult)   Pulse 84   Temp 97.4 °F (36.3 °C) (Temporal)   Resp 18   Ht 158.8 cm (62.5\")   Wt 65.2 kg (143 lb 12.8 oz)   LMP 06/02/2025 (Exact Date)   SpO2 98%   BMI 25.88 kg/m²   BP Readings from Last 3 Encounters:   06/04/25 124/82   04/24/24 122/72   04/19/23 126/74     Wt Readings from Last 3 Encounters:   06/04/25 65.2 kg (143 lb 12.8 oz)   04/24/24 71.5 kg (157 lb 9.6 oz)   04/19/23 69.1 kg (152 lb 6.4 oz)     Physical Exam  Constitutional:       Appearance: Normal appearance. She is well-developed. She is not diaphoretic. "   HENT:      Head: Normocephalic and atraumatic.      Right Ear: Tympanic membrane, ear canal and external ear normal.      Left Ear: Tympanic membrane, ear canal and external ear normal.      Nose: Nose normal.      Mouth/Throat:      Mouth: Mucous membranes are moist.   Eyes:      General: Lids are normal.      Extraocular Movements: Extraocular movements intact.      Conjunctiva/sclera: Conjunctivae normal.      Pupils: Pupils are equal, round, and reactive to light.   Neck:      Thyroid: No thyromegaly.      Vascular: No carotid bruit or JVD.      Trachea: No tracheal deviation.   Cardiovascular:      Rate and Rhythm: Normal rate and regular rhythm.      Heart sounds: Normal heart sounds. No murmur heard.     No friction rub. No gallop.   Pulmonary:      Effort: Pulmonary effort is normal.      Breath sounds: Normal breath sounds. No stridor. No decreased breath sounds, wheezing or rales.   Abdominal:      General: Bowel sounds are normal. There is no distension.      Palpations: Abdomen is soft. There is no mass.      Tenderness: There is no abdominal tenderness. There is no guarding or rebound.      Hernia: No hernia is present.   Lymphadenopathy:      Head:      Right side of head: No submental, submandibular, tonsillar, preauricular, posterior auricular or occipital adenopathy.      Left side of head: No submental, submandibular, tonsillar, preauricular, posterior auricular or occipital adenopathy.      Cervical: No cervical adenopathy.   Skin:     General: Skin is warm and dry.      Coloration: Skin is not pale.   Neurological:      Mental Status: She is alert and oriented to person, place, and time.      Cranial Nerves: No cranial nerve deficit.      Sensory: No sensory deficit.      Coordination: Coordination normal.      Gait: Gait normal.      Deep Tendon Reflexes: Reflexes are normal and symmetric.         Data / Lab Results:    Hemoglobin A1C   Date Value Ref Range Status   04/12/2021 5.4 % Final  "  04/03/2018 5.3 4.6 - 7.1 % Final     Lab Results   Component Value Date    Glucose 93 05/29/2025    Glucose, UA Negative 06/04/2025     Lab Results   Component Value Date    LDL 86 05/29/2025    LDL 99 04/06/2024    LDL 98 04/14/2023     Lab Results   Component Value Date    CHOL 184 04/02/2019    CHOL 153 03/27/2018     Lab Results   Component Value Date    TRIG 47 05/29/2025    TRIG 45 04/06/2024    TRIG 49 04/14/2023     Lab Results   Component Value Date    HDL 89 05/29/2025    HDL 83 04/06/2024    HDL 71 04/14/2023     No results found for: \"PSA\"  Lab Results   Component Value Date    WBC 4.9 05/29/2025    HGB 11.1 05/29/2025    HCT 36.4 05/29/2025    MCV 80 05/29/2025     05/29/2025     Lab Results   Component Value Date    TSH 1.480 05/29/2025     Lab Results   Component Value Date    GLUCOSE 93 05/29/2025    BUN 19 05/29/2025    CREATININE 1.17 (H) 05/29/2025    EGFRIFNONA 53 (L) 03/31/2021    EGFRIFAFRI 62 03/31/2021    BCR 16 05/29/2025    K 4.1 05/29/2025    CO2 23 05/29/2025    CALCIUM 9.5 05/29/2025    ALBUMIN 4.4 05/29/2025    AST 16 05/29/2025    ALT 8 05/29/2025     Lab Results   Component Value Date    AILYN Negative 05/29/2025      No results found for: \"CRP\"   No results found for: \"IRON\", \"TIBC\", \"FERRITIN\"   No results found for: \"[id number]\"     Age-appropriate Screening Schedule:  Refer to the list below for future screening recommendations based on patient's age, sex and/or medical conditions. Orders for these recommended tests are listed in the plan section. The patient has been provided with a written plan.    Health Maintenance   Topic Date Due    Pneumococcal Vaccine 50+ (1 of 2 - PCV) Never done    HEPATITIS C SCREENING  Never done    ZOSTER VACCINE (1 of 2) Never done    COVID-19 Vaccine (1 - 2024-25 season) Never done    INFLUENZA VACCINE  07/01/2025    MAMMOGRAM  04/24/2026    ANNUAL PHYSICAL  06/04/2026    PAP SMEAR  03/15/2027    COLORECTAL CANCER SCREENING  04/27/2027    " TDAP/TD VACCINES (2 - Td or Tdap) 11/07/2027           Assessment & Plan      Medications        Problem List         LOS    Physical.  Doing well, vaccines current.  Discussed calcium and vitamin D.  Coated baby aspirin daily.  Followed by OB/GYN Dr. Duarte, mammogram scheduled.  Has had pap will get records.  Discussed health maintenance, screening test lifestyle modification.  EKG benign.   Mixed hyperlipidemia.  Improved today, LDL normal.  Discussed diet, exercise cholesterol modification   Family h/o CAD / CVA check coronary artery screenign ct / vascular bundle  Hypothyroidism.  Good control.  Remote history of normal thyroid ultrasound, repeat 2022 with thyroiditis, no nodules..  Follow-up recheck.  Vitamin D deficiency.  Recurrent/restart supplementation.  Follow-up recheck.  Varicose veins.  Mild symptoms currently.  Consider vascular surgery referral if worsening symptoms.  Seborrheic keratosis.  Improved/resolved status post freezing   Allergic rhinitis.  Stable on antihistamine/nasal steroid.  Rotator cuff tendinitis.  Right.  Much improved resolved status post injection..  Expected course discussed.  Consider imaging if persistent symptoms.  Renal insufficiency.  Mild, stable this year, GFR 56.  No proteinuria, positive persistent hematuria today, she does not have history of kidney stones..  Remote history of benign eval 15 years ago per nephrology per her report.  blood work/renal ultrasound benign.  She is avoiding NSAIDs.  Limit caffeine, increase fluid intake.  Follow-up recheck.  Consider nephrology referral if worsening symptoms.  Hematuria.  Possibly 2nd menstruation today, recheck ua 2 weeks, considering renal insufficiency plan nephrology eval if hematureia persists, consider urology eval.  Hip pain.  Likely bursitis, start prednisone.  Ice, rehab exercises discussed.  Ddx includes stress fx consider xray, pt referrall if if persistent symptoms   Colon screening.  Cologuard negative  2024.  Mulitiple nevi.  Benign exam today, sun protection discussed.  follow up for regular skin checks.     Previous Exams:  PAP Smear was on 43/18/2024 by Dr Duarte.                Cervical Polyp                            Benign endocervical polyp, chronic cervicitis                Repeat in 1 year  Mammogram was on 4/21/2022               Impression of Normal               Recommended repeat in 1 year  Patient had a mammogram completed on 4/24/2024- Waiting for the results  US Thyroid was on 4/26/22                Impression of Diffuse thyroid disease resulting in altered heterogeneous echogenicity of the thyroid gland and borderline small size of the gland.   No hypervascularity. Rather the vascularity of the gland appears slightly decreased although this may relate to technique   No thyroid nodules  ColoGuard was on 4/27/2024.     Impression of Negative    Recommended repeat in 3 years  EKG was on 4/24/2024      Diagnoses and all orders for this visit:    1. Encounter for annual general medical examination with abnormal findings in adult (Primary)  -     POC Urinalysis Dipstick  -     Comprehensive Metabolic Panel; Future  -     CBC & Differential; Future  -     Lipid Panel With / Chol / HDL Ratio; Future  -     TSH; Future    2. Acquired hypothyroidism  -     Comprehensive Metabolic Panel; Future  -     CBC & Differential; Future  -     Lipid Panel With / Chol / HDL Ratio; Future  -     TSH; Future  -     T4, Free; Future    3. Hypertension, benign  -     Comprehensive Metabolic Panel; Future  -     CBC & Differential; Future  -     Lipid Panel With / Chol / HDL Ratio; Future  -     TSH; Future    4. Overweight with body mass index (BMI) of 29 to 29.9 in adult    5. Screening for malignant neoplasm of cervix    6. Screening for malignant neoplasm of colon    7. Hematuria, unspecified type  -     Urine Culture - Urine, Urine, Random Void    8. Right hip pain  -     predniSONE (DELTASONE) 5 MG tablet; 40mg x 3  days, 20mg x 3 days, 10mg x 3 days, 5mg x 3 days  Dispense: 45 tablet; Refill: 0    9. Screening for cardiovascular condition  -     Vascular Screening (Bundle) CAR; Future  -     CT Cardiac Calcium Score Without Dye; Future    10. Screening mammogram for breast cancer  -     Mammo Screening Digital Tomosynthesis Bilateral With CAD; Future    11. Vitamin D deficiency    12. Renal insufficiency  Assessment & Plan:                  Expected course, medications, and adverse effects discussed.  Call or return if worsening or persistent symptoms.  I wore protective equipment throughout this patient encounter including a mask, gloves, and eye protection.  Hand hygiene was performed before donning protective equipment and after removal when leaving the room. The complete contents of the Assessment and Plan and Data / Lab Results as documented above have been reviewed and addressed by myself with the patient today as part of an ongoing evaluation / treatment plan.  If some of the documentation has been copied from a previous note and is unchanged it indicates that this problem / plan has been assessed today but is stable from a previous visit and no changes have been recommended.  The separate E/M service provided today is significant, medically necessary, and separately identifiable.

## 2025-06-04 ENCOUNTER — OFFICE VISIT (OUTPATIENT)
Dept: FAMILY MEDICINE CLINIC | Facility: CLINIC | Age: 50
End: 2025-06-04
Payer: COMMERCIAL

## 2025-06-04 VITALS
HEIGHT: 63 IN | SYSTOLIC BLOOD PRESSURE: 124 MMHG | TEMPERATURE: 97.4 F | DIASTOLIC BLOOD PRESSURE: 82 MMHG | OXYGEN SATURATION: 98 % | HEART RATE: 84 BPM | WEIGHT: 143.8 LBS | RESPIRATION RATE: 18 BRPM | BODY MASS INDEX: 25.48 KG/M2

## 2025-06-04 DIAGNOSIS — E03.9 ACQUIRED HYPOTHYROIDISM: ICD-10-CM

## 2025-06-04 DIAGNOSIS — I10 HYPERTENSION, BENIGN: ICD-10-CM

## 2025-06-04 DIAGNOSIS — Z13.6 SCREENING FOR CARDIOVASCULAR CONDITION: ICD-10-CM

## 2025-06-04 DIAGNOSIS — E66.3 OVERWEIGHT WITH BODY MASS INDEX (BMI) OF 29 TO 29.9 IN ADULT: ICD-10-CM

## 2025-06-04 DIAGNOSIS — Z00.01 ENCOUNTER FOR ANNUAL GENERAL MEDICAL EXAMINATION WITH ABNORMAL FINDINGS IN ADULT: Primary | ICD-10-CM

## 2025-06-04 DIAGNOSIS — Z12.4 SCREENING FOR MALIGNANT NEOPLASM OF CERVIX: ICD-10-CM

## 2025-06-04 DIAGNOSIS — E55.9 VITAMIN D DEFICIENCY: ICD-10-CM

## 2025-06-04 DIAGNOSIS — N28.9 RENAL INSUFFICIENCY: ICD-10-CM

## 2025-06-04 DIAGNOSIS — Z12.11 SCREENING FOR MALIGNANT NEOPLASM OF COLON: ICD-10-CM

## 2025-06-04 DIAGNOSIS — Z12.31 SCREENING MAMMOGRAM FOR BREAST CANCER: ICD-10-CM

## 2025-06-04 DIAGNOSIS — M25.551 RIGHT HIP PAIN: ICD-10-CM

## 2025-06-04 DIAGNOSIS — R31.9 HEMATURIA, UNSPECIFIED TYPE: ICD-10-CM

## 2025-06-04 LAB
BILIRUB BLD-MCNC: NEGATIVE MG/DL
CLARITY, POC: CLEAR
COLOR UR: ABNORMAL
GLUCOSE UR STRIP-MCNC: NEGATIVE MG/DL
KETONES UR QL: NEGATIVE
LEUKOCYTE EST, POC: NEGATIVE
NITRITE UR-MCNC: NEGATIVE MG/ML
PH UR: 6.5 [PH] (ref 5–8)
PROT UR STRIP-MCNC: NEGATIVE MG/DL
RBC # UR STRIP: ABNORMAL /UL
SP GR UR: 1 (ref 1–1.03)
UROBILINOGEN UR QL: NORMAL

## 2025-06-04 RX ORDER — PREDNISONE 5 MG/1
TABLET ORAL
Qty: 45 TABLET | Refills: 0 | Status: SHIPPED | OUTPATIENT
Start: 2025-06-04

## 2025-06-09 ENCOUNTER — TELEPHONE (OUTPATIENT)
Dept: FAMILY MEDICINE CLINIC | Facility: CLINIC | Age: 50
End: 2025-06-09
Payer: COMMERCIAL

## 2025-06-09 DIAGNOSIS — M54.50 ACUTE RIGHT-SIDED LOW BACK PAIN, UNSPECIFIED WHETHER SCIATICA PRESENT: ICD-10-CM

## 2025-06-09 DIAGNOSIS — M25.551 RIGHT HIP PAIN: Primary | ICD-10-CM

## 2025-06-09 LAB
BACTERIA UR CULT: ABNORMAL
BACTERIA UR CULT: ABNORMAL
OTHER ANTIBIOTIC SUSC ISLT: ABNORMAL

## 2025-06-09 NOTE — TELEPHONE ENCOUNTER
Patient was seen on 6/4/2025 for Physical. She was prescribed Prednisone for her Right Hip Pain. Patient is asking for an x-ray to be ordered

## 2025-06-10 ENCOUNTER — HOSPITAL ENCOUNTER (OUTPATIENT)
Dept: GENERAL RADIOLOGY | Facility: HOSPITAL | Age: 50
Discharge: HOME | End: 2025-06-10
Payer: COMMERCIAL

## 2025-06-10 PROCEDURE — 72110 X-RAY EXAM L-2 SPINE 4/>VWS: CPT

## 2025-06-10 PROCEDURE — 73502 X-RAY EXAM HIP UNI 2-3 VIEWS: CPT

## 2025-06-22 ENCOUNTER — RESULTS FOLLOW-UP (OUTPATIENT)
Dept: FAMILY MEDICINE CLINIC | Facility: CLINIC | Age: 50
End: 2025-06-22
Payer: COMMERCIAL

## 2025-06-25 ENCOUNTER — CLINICAL SUPPORT (OUTPATIENT)
Dept: FAMILY MEDICINE CLINIC | Facility: CLINIC | Age: 50
End: 2025-06-25
Payer: COMMERCIAL

## 2025-06-25 DIAGNOSIS — N28.9 RENAL INSUFFICIENCY: ICD-10-CM

## 2025-06-25 DIAGNOSIS — R31.9 HEMATURIA, UNSPECIFIED TYPE: Primary | ICD-10-CM

## 2025-06-25 LAB
BILIRUB BLD-MCNC: NEGATIVE MG/DL
CLARITY, POC: CLEAR
COLOR UR: YELLOW
GLUCOSE UR STRIP-MCNC: NEGATIVE MG/DL
KETONES UR QL: NEGATIVE
LEUKOCYTE EST, POC: NEGATIVE
NITRITE UR-MCNC: NEGATIVE MG/ML
PH UR: 6.5 [PH] (ref 5–8)
PROT UR STRIP-MCNC: NEGATIVE MG/DL
RBC # UR STRIP: ABNORMAL /UL
SP GR UR: 1.01 (ref 1–1.03)
UROBILINOGEN UR QL: NORMAL

## 2025-06-25 PROCEDURE — 81002 URINALYSIS NONAUTO W/O SCOPE: CPT | Performed by: FAMILY MEDICINE

## 2025-06-27 LAB
BACTERIA UR CULT: NORMAL
BACTERIA UR CULT: NORMAL

## 2025-08-25 DIAGNOSIS — E03.9 HYPOTHYROIDISM, UNSPECIFIED TYPE: ICD-10-CM

## 2025-08-25 RX ORDER — LEVOTHYROXINE SODIUM 125 UG/1
125 TABLET ORAL DAILY
Qty: 90 TABLET | Refills: 1 | Status: SHIPPED | OUTPATIENT
Start: 2025-08-25